# Patient Record
Sex: FEMALE | Race: ASIAN | Employment: UNEMPLOYED | URBAN - METROPOLITAN AREA
[De-identification: names, ages, dates, MRNs, and addresses within clinical notes are randomized per-mention and may not be internally consistent; named-entity substitution may affect disease eponyms.]

---

## 2022-05-24 ENCOUNTER — OFFICE VISIT (OUTPATIENT)
Dept: CARDIOLOGY CLINIC | Age: 87
End: 2022-05-24
Payer: MEDICARE

## 2022-05-24 VITALS — DIASTOLIC BLOOD PRESSURE: 80 MMHG | WEIGHT: 143 LBS | SYSTOLIC BLOOD PRESSURE: 126 MMHG | HEART RATE: 94 BPM

## 2022-05-24 DIAGNOSIS — I49.9 IRREGULAR HEART RATE: ICD-10-CM

## 2022-05-24 DIAGNOSIS — R07.2 PRECORDIAL PAIN: ICD-10-CM

## 2022-05-24 DIAGNOSIS — I10 PRIMARY HYPERTENSION: ICD-10-CM

## 2022-05-24 DIAGNOSIS — I48.0 PAROXYSMAL ATRIAL FIBRILLATION (HCC): ICD-10-CM

## 2022-05-24 DIAGNOSIS — R07.9 CHEST PAIN, UNSPECIFIED TYPE: Primary | ICD-10-CM

## 2022-05-24 PROCEDURE — 99204 OFFICE O/P NEW MOD 45 MIN: CPT | Performed by: INTERNAL MEDICINE

## 2022-05-24 PROCEDURE — 93000 ELECTROCARDIOGRAM COMPLETE: CPT | Performed by: INTERNAL MEDICINE

## 2022-05-24 RX ORDER — NITROGLYCERIN 80 MG/1
PATCH TRANSDERMAL
COMMUNITY
Start: 2022-04-07 | End: 2022-05-24

## 2022-05-24 RX ORDER — SOLIFENACIN SUCCINATE 5 MG/1
10 TABLET, FILM COATED ORAL DAILY
COMMUNITY

## 2022-05-24 RX ORDER — CLOPIDOGREL BISULFATE 75 MG/1
TABLET ORAL
COMMUNITY
Start: 2022-05-12 | End: 2022-05-24

## 2022-05-24 RX ORDER — EFINACONAZOLE 100 MG/ML
SOLUTION TOPICAL
COMMUNITY
Start: 2022-05-23

## 2022-05-24 RX ORDER — LIDOCAINE 36 MG/1
PATCH TOPICAL
COMMUNITY
Start: 2022-05-20

## 2022-05-24 RX ORDER — LOSARTAN POTASSIUM 25 MG/1
TABLET ORAL
COMMUNITY
Start: 2022-05-23

## 2022-05-24 RX ORDER — NITROGLYCERIN 80 MG/1
1 PATCH TRANSDERMAL DAILY
Qty: 30 PATCH | Refills: 5 | Status: SHIPPED | OUTPATIENT
Start: 2022-05-24 | End: 2022-09-26

## 2022-05-24 RX ORDER — CYCLOBENZAPRINE HCL 10 MG
TABLET ORAL
COMMUNITY
Start: 2022-04-08

## 2022-05-24 ASSESSMENT — ENCOUNTER SYMPTOMS
CHEST TIGHTNESS: 0
BACK PAIN: 0
SHORTNESS OF BREATH: 0
BLOOD IN STOOL: 0
EYE DISCHARGE: 0
ABDOMINAL DISTENTION: 0
FACIAL SWELLING: 0
COUGH: 0
WHEEZING: 0
ABDOMINAL PAIN: 0
VOMITING: 0
COLOR CHANGE: 0

## 2022-05-24 NOTE — PROGRESS NOTES
[address] [facility]     Outpatient Cardiology         Patient Name:  Karla Landis  Requesting Physician: No admitting provider for patient encounter. Primary Care Physician: No primary care provider on file. Reason for Consultation/Chief Complaint:   Chief Complaint   Patient presents with    Atrial Fibrillation         History of Present Illness:    HPI     Yulissa Dean a 80 y.o. female with PMH of afib, HTN. Here for afib and chest pain. Referred by PCP. Hypertension, controlled current medications  A. fib, diagnosed in [place], on Plavix. Not on anticoagulation. Rate controlled today. We will get echocardiogram  Chest discomfort, precordial, triggered by exertion, relieved by rest, last anywhere from a few minutes to an hour. Associated with some shortness of breath    PMH  Past Medical History:   Diagnosis Date    Atrial fibrillation (HCC)     Hypertension        PSH  Past Surgical History:   Procedure Laterality Date    HYSTERECTOMY          Social HIstory  Social History     Tobacco Use    Smoking status: Never Smoker    Smokeless tobacco: Never Used   Substance Use Topics    Alcohol use: Never    Drug use: Never       Family History  History reviewed. No pertinent family history. Allergies   No Known Allergies    Medications:     Home Medications:  Were reviewed and are listed in nursing record. and/or listed below    Prior to Admission medications    Medication Sig Start Date End Date Taking?  Authorizing Provider   cyclobenzaprine (FLEXERIL) 10 mg tablet  4/8/22  Yes Historical Provider, MD WALTON 10 % SOLN  5/23/22  Yes Historical Provider, MD   ZTLIDO 1.8 % [facility]  5/20/22  Yes Historical Provider, MD   losartan (COZAAR) 25 MG tablet  5/23/22  Yes Historical Provider, MD   solifenacin (VESICARE) 5 MG tablet Take 10 mg by mouth daily   Yes Historical Provider, MD   nitroGLYCERIN (NITRODUR) 0.4 MG/HR Place 1 patch onto the skin daily 5/24/22  Yes Odin YMERS Teressa Medellin MD   apixaban Zilphia Sprain) 2.5 MG TABS tablet Take 1 tablet by mouth 2 times daily 5/24/22  Yes Keven Curtis MD        Review of Systems   Constitutional: Negative for activity change, appetite change, diaphoresis, fatigue, fever and unexpected weight change. HENT: Negative for congestion, facial swelling, mouth sores and nosebleeds. Eyes: Negative for discharge and visual disturbance. Respiratory: Negative for cough, chest tightness, shortness of breath and wheezing. Cardiovascular: Negative for chest pain, palpitations and leg swelling. Gastrointestinal: Negative for abdominal distention, abdominal pain, blood in stool and vomiting. Endocrine: Negative for cold intolerance, heat intolerance and polyuria. Genitourinary: Negative for difficulty urinating, dysuria, frequency and hematuria. Musculoskeletal: Negative for back pain, joint swelling, myalgias and neck pain. Skin: Negative for color change, pallor and rash. Allergic/Immunologic: Negative for immunocompromised state. Neurological: Negative for dizziness, syncope, weakness, light-headedness, numbness and headaches. Hematological: Negative for adenopathy. Does not bruise/bleed easily. Psychiatric/Behavioral: Negative for behavioral problems, confusion, decreased concentration and suicidal ideas. The patient is not nervous/anxious. Vitals:    05/24/22 1401   BP: 126/80   Pulse: 94    Weight: 143 lb (64.9 kg)       Vitals:    05/24/22 1401   BP: 126/80   Pulse: 94   Weight: 143 lb (64.9 kg)       BP Readings from Last 3 Encounters:   05/24/22 126/80       Wt Readings from Last 3 Encounters:   05/24/22 143 lb (64.9 kg)       Physical Exam  Constitutional:       General: She is not in acute distress. Appearance: She is well-developed. She is not diaphoretic. HENT:      Head: Normocephalic and atraumatic. Eyes:      Pupils: Pupils are equal, round, and reactive to light. Neck:      Thyroid: No thyromegaly. Vascular: No JVD. Cardiovascular:      Rate and Rhythm: Normal rate and regular rhythm. Chest Wall: PMI is not displaced. Heart sounds: Normal heart sounds, S1 normal and S2 normal. No murmur heard. No friction rub. No gallop. Pulmonary:      Effort: Pulmonary effort is normal. No respiratory distress. Breath sounds: Normal breath sounds. No stridor. No wheezing or rales. Chest:      Chest wall: No tenderness. Abdominal:      General: Bowel sounds are normal. There is no distension. Palpations: Abdomen is soft. Tenderness: There is no abdominal tenderness. There is no guarding or rebound. Musculoskeletal:         General: No tenderness. Normal range of motion. Cervical back: Normal range of motion. Lymphadenopathy:      Cervical: No cervical adenopathy. Skin:     General: Skin is warm and dry. Findings: No erythema or rash. Neurological:      Mental Status: She is alert and oriented to person, place, and time. Coordination: Coordination normal.   Psychiatric:         Behavior: Behavior normal.         Thought Content: Thought content normal.         Judgment: Judgment normal.         Labs:       No results found for: WBC, HGB, HCT, MCV, PLT  No results found for: NA, K, CL, CO2, BUN, CREATININE, GLUCOSE, CALCIUM, PROT, LABALBU, BILITOT, ALKPHOS, AST, ALT, LABGLOM, GFRAA, AGRATIO, GLOB      No results found for: CHOL  No results found for: TRIG  No results found for: HDL  No results found for: LDLCHOLESTEROL, LDLCALC  No results found for: LABVLDL, VLDL  No results found for: CHOLHDLRATIO    No results found for: INR, PROTIME    The ASCVD Risk score (Fabrizio Ricardo et al., 2013) failed to calculate for the following reasons:     The 2013 ASCVD risk score is only valid for ages 36 to 78      Imaging:       Last ECG (if available, Personally interpreted):  Atrial fibrillation  Last Monitor/Holter (if available):    Last Stress (if available):    Last Cath (if available):    Last TTE/TIEN(if available):    Last CMR  (if available):    Last Coronary Artery Calcium Score: Ankle-brachial index:    Carotid ultrasound screening:    Abdominal aortic aneurysm screening:       Assessment / Plan:     Paroxysmal atrial fibrillation (HCC)  Discontinue Plavix. Transition to Eliquis 2.5 twice a day. Rate controlled. Plan for echocardiogram and a stress test given symptoms. Primary hypertension  Controlled on losartan    Precordial pain  Using Nitropatch more frequently. Plan for stress test.      Follow up in 1 months. I had the opportunity to review the clinical symptoms and presentation of Rosalina Call. Patient's allergies and medications were reviewed and updated. Patient's past medical, surgical, social and family history were reviewed and updated. Patient's testing including laboratory, ECGs, monitor, imaging (TTE,TIEN,CMR,cath) were reviewed. Tobacco use was discussed with the patient and educated on the negative effects. I have asked the patient to not utilize these agents. All questions and concerns were addressed to the patient/family. Alternatives to my treatment were discussed. The note was completed using EMR. Every effort wasmade to ensure accuracy; however, inadvertent computerized transcription errors may be present. Thank you for allowing me to participate in thecare or [address] YIMI Martinez MD, [facility], [facility] [address]

## 2022-05-24 NOTE — ASSESSMENT & PLAN NOTE
Discontinue Plavix. Transition to Eliquis 2.5 twice a day. Rate controlled. Plan for echocardiogram and a stress test given symptoms.

## 2022-06-24 ENCOUNTER — HOSPITAL ENCOUNTER (OUTPATIENT)
Dept: NON INVASIVE DIAGNOSTICS | Age: 87
Discharge: HOME | End: 2022-06-24
Payer: MEDICARE

## 2022-06-24 DIAGNOSIS — R07.9 CHEST PAIN, UNSPECIFIED TYPE: ICD-10-CM

## 2022-06-24 LAB
LV EF: 58 %
LV EF: 76 %
LVEF MODALITY: NORMAL
LVEF MODALITY: NORMAL

## 2022-06-24 PROCEDURE — A9502 TC99M TETROFOSMIN: HCPCS | Performed by: INTERNAL MEDICINE

## 2022-06-24 PROCEDURE — 93306 TTE W/DOPPLER COMPLETE: CPT

## 2022-06-24 PROCEDURE — 3430000000 HC RX DIAGNOSTIC RADIOPHARMACEUTICAL: Performed by: INTERNAL MEDICINE

## 2022-06-24 PROCEDURE — 78452 HT MUSCLE IMAGE SPECT MULT: CPT

## 2022-06-24 PROCEDURE — 93017 CV STRESS TEST TRACING ONLY: CPT

## 2022-06-24 RX ADMIN — TETROFOSMIN 10 MILLICURIE: 1.38 INJECTION, POWDER, LYOPHILIZED, FOR SOLUTION INTRAVENOUS at 12:49

## 2022-06-29 ENCOUNTER — TELEPHONE (OUTPATIENT)
Dept: CARDIOLOGY CLINIC | Age: 87
End: 2022-06-29

## 2022-06-29 NOTE — TELEPHONE ENCOUNTER
Pt son wants to know if appt can be a VV. Mom is older and it would be easier to go over results they already received.  [phone]

## 2022-06-30 NOTE — TELEPHONE ENCOUNTER
Spoke with patient's son, patient is getting an upset stomach from taking eliquis and she would like  to go back on Plavix. Or would try alternative.

## 2022-07-04 NOTE — TELEPHONE ENCOUNTER
Can try Xarelto  Plavix is not indicated in Afib  If she doesn't want anticoagulation ASA 81 is the next \"best\" option  Thx

## 2022-07-05 NOTE — TELEPHONE ENCOUNTER
Spoke with patient's son and discussed options. He is going to talk with his mother and call back with her decision.

## 2022-07-06 ENCOUNTER — TELEPHONE (OUTPATIENT)
Dept: CARDIOLOGY CLINIC | Age: 87
End: 2022-07-06

## 2022-07-06 RX ORDER — ASPIRIN 81 MG/1
81 TABLET ORAL DAILY
Qty: 30 TABLET | Refills: 5 | Status: SHIPPED | OUTPATIENT
Start: 2022-07-06

## 2022-07-06 RX ORDER — ASPIRIN 81 MG/1
81 TABLET ORAL DAILY
Qty: 30 TABLET | Refills: 5 | Status: SHIPPED | OUTPATIENT
Start: 2022-07-06 | End: 2022-07-06

## 2022-07-06 NOTE — TELEPHONE ENCOUNTER
Patient's son called. Pt is having some side effects from the Elqiuis. He wants note from Dr. Helder Feldman that he can give to nursing home stating she can stop this and just be on aspirin. He would like this faxed to him. His fax number is [phone]    He would like a call to let him know when this has been faxed so he can make sure he has received it.

## 2022-09-26 RX ORDER — NITROGLYCERIN 80 MG/1
PATCH TRANSDERMAL
Qty: 90 PATCH | Refills: 3 | Status: SHIPPED | OUTPATIENT
Start: 2022-09-26

## 2022-12-19 ASSESSMENT — ENCOUNTER SYMPTOMS
BACK PAIN: 0
COUGH: 0
SHORTNESS OF BREATH: 0
VOMITING: 0
FACIAL SWELLING: 0
ABDOMINAL DISTENTION: 0
CHEST TIGHTNESS: 0
WHEEZING: 0
COLOR CHANGE: 0
EYE DISCHARGE: 0
BLOOD IN STOOL: 0
ABDOMINAL PAIN: 0

## 2022-12-19 NOTE — PROGRESS NOTES
[address] [facility]     Outpatient Cardiology         Patient Name:  Andre Maza  Requesting Physician: No admitting provider for patient encounter. Primary Care Physician: Josefina Byrnes MD    Reason for Consultation/Chief Complaint:   Chief Complaint   Patient presents with    Hypertension                  History of Present Illness:    HPI     Mehnaz Odom a 80 y.o. female with PMH of afib, HTN. Okay for anxiety  130 and 1 does not matter which 1 which is good given the best  A. fib, diagnosed in [place], heart rate is controlled. Doing well with Eliquis. Discontinue aspirin. Hypertension. Uncontrolled today. Will adjust medications. Chest pain, resolved. PMH  Past Medical History:   Diagnosis Date    Atrial fibrillation ([address]     Hypertension        PSH  Past Surgical History:   Procedure Laterality Date    HYSTERECTOMY (CERVIX STATUS UNKNOWN)          Social HIstory  Social History     Tobacco Use    Smoking status: Never    Smokeless tobacco: Never   Substance Use Topics    Alcohol use: Never    Drug use: Never       Family History  No family history on file. Allergies   No Known Allergies    Medications:     Home Medications:  Were reviewed and are listed in nursing record. and/or listed below    Prior to Admission medications    Medication Sig Start Date End Date Taking?  Authorizing Provider   ALPRAZolam Surprise Creamer) 0.25 MG tablet  10/21/22  Yes Historical Provider, MD   apixaban (ELIQUIS) 2.5 MG TABS tablet  10/15/22  Yes Historical Provider, MD   methocarbamol (ROBAXIN) 500 MG tablet  12/16/22  Yes Historical Provider, MD   oxybutynin (DITROPAN-XL) 5 MG extended release tablet  11/19/22  Yes Historical Provider, MD   losartan (COZAAR) 25 MG tablet Take 1 tablet by mouth 2 times daily 12/20/22  Yes Donna Chaudhry MD   nitroGLYCERIN (NITRODUR) 0.4 MG/HR UNWRAP AND APPLY 1 PATCH ONTO THE SKIN ONCE DAILY 9/26/22  Yes Donna Chaudhry MD   cyclobenzaprine ([place]) 10 mg tablet  4/8/22  Yes Historical Provider, MD WALTON 10 % SOLN  5/23/22  Yes Historical Provider, MD   ZTLIDO 1.8 % [facility]  5/20/22   Historical Provider, MD   solifenacin (VESICARE) 5 MG tablet Take 10 mg by mouth daily  Patient not taking: Reported on 12/20/2022    Historical Provider, MD        Review of Systems   Constitutional:  Negative for activity change, appetite change, diaphoresis, fatigue, fever and unexpected weight change. HENT:  Negative for congestion, facial swelling, mouth sores and nosebleeds. Eyes:  Negative for discharge and visual disturbance. Respiratory:  Negative for cough, chest tightness, shortness of breath and wheezing. Cardiovascular:  Negative for chest pain, palpitations and leg swelling. Gastrointestinal:  Negative for abdominal distention, abdominal pain, blood in stool and vomiting. Endocrine: Negative for cold intolerance, heat intolerance and polyuria. Genitourinary:  Negative for difficulty urinating, dysuria, frequency and hematuria. Musculoskeletal:  Negative for back pain, joint swelling, myalgias and neck pain. Skin:  Negative for color change, pallor and rash. Allergic/Immunologic: Negative for immunocompromised state. Neurological:  Negative for dizziness, syncope, weakness, light-headedness, numbness and headaches. Hematological:  Negative for adenopathy. Does not bruise/bleed easily. Psychiatric/Behavioral:  Negative for behavioral problems, confusion, decreased concentration and suicidal ideas. The patient is not nervous/anxious.       Vitals:    12/20/22 1436   BP: (!) 160/90   Pulse: 95    Weight: 135 lb 3.2 oz (61.3 kg)       Vitals:    12/20/22 1422 12/20/22 1435 12/20/22 1436   BP: 130/70 (!) 160/80 (!) 160/90   Site: Left Upper Arm Right Upper Arm Left Upper Arm   Position: Supine Sitting Sitting   Cuff Size: Medium Adult Medium Adult Medium Adult   Pulse: 84 79 95   Weight: 135 lb 3.2 oz (61.3 kg)         BP Readings from Last 3 Encounters:   12/20/22 (!) 160/90   05/24/22 126/80       Wt Readings from Last 3 Encounters:   12/20/22 135 lb 3.2 oz (61.3 kg)   05/24/22 143 lb (64.9 kg)       Physical Exam  Constitutional:       General: She is not in acute distress. Appearance: She is well-developed. She is not diaphoretic. HENT:      Head: Normocephalic and atraumatic. Eyes:      Pupils: Pupils are equal, round, and reactive to light. Neck:      Thyroid: No thyromegaly. Vascular: No JVD. Cardiovascular:      Rate and Rhythm: Normal rate and regular rhythm. Chest Wall: PMI is not displaced. Heart sounds: Normal heart sounds, S1 normal and S2 normal. No murmur heard. No friction rub. No gallop. Pulmonary:      Effort: Pulmonary effort is normal. No respiratory distress. Breath sounds: Normal breath sounds. No stridor. No wheezing or rales. Chest:      Chest wall: No tenderness. Abdominal:      General: Bowel sounds are normal. There is no distension. Palpations: Abdomen is soft. Tenderness: There is no abdominal tenderness. There is no guarding or rebound. Musculoskeletal:         General: No tenderness. Normal range of motion. Cervical back: Normal range of motion. Lymphadenopathy:      Cervical: No cervical adenopathy. Skin:     General: Skin is warm and dry. Findings: No erythema or rash. Neurological:      Mental Status: She is alert and oriented to person, place, and time. Coordination: Coordination normal.   Psychiatric:         Behavior: Behavior normal.         Thought Content:  Thought content normal.         Judgment: Judgment normal.       Labs:       No results found for: WBC, HGB, HCT, MCV, PLT  No results found for: NA, K, CL, CO2, BUN, CREATININE, GLUCOSE, CALCIUM, PROT, LABALBU, BILITOT, ALKPHOS, AST, ALT, LABGLOM, GFRAA, AGRATIO, GLOB      No results found for: CHOL  No results found for: TRIG  No results found for: HDL  No results found for: LDLCHOLESTEROL, LDLCALC  No results found for: LABVLDL, VLDL  No results found for: CHOLHDLRATIO    No results found for: INR, PROTIME    The ASCVD Risk score (Gerry DK, et al., 2019) failed to calculate for the following reasons: The 2019 ASCVD risk score is only valid for ages 36 to 78      Imaging:       Last ECG (if available, Personally interpreted):  Atrial fibrillation  Last Monitor/Holter (if available):    Last Stress (if available): 6/24/22  Summary    Overall findings represent a low risk scan. There is normal isotope uptake at stress and rest. There is no evidence of    myocardial ischemia or scar. Normal LV size and systolic function. Normal myocardial perfusion study. Non-diagnostic EKG response due to failure to reach target heart rate. Last Cath (if available):    Last TTE/EDIS(if available): 6/24/22  Summary   Left ventricular cavity size is normal. There is mild concentric left   ventricular hypertrophy. Overall left ventricular systolic function appears   normal with an ejection fraction of 55-60%. No regional wall motion   abnormalities are noted. Normal diastolic function. Mild mitral regurgitation is present. The aortic valve leaflets are slightly thickened /calcified but the valve   opens adequately. Estimated pulmonary artery systolic pressure is at 23 mmHg assuming a right   atrial pressure of 3 mmHg. Normal right ventricular size and function. The left atrial size appears dilated. The right atrium appears dilated. Last CMR  (if available):    Last Coronary Artery Calcium Score: Ankle-brachial index:    Carotid ultrasound screening:    Abdominal aortic aneurysm screening:       Assessment / Plan:     Primary hypertension  Uncontrolled today. Increase to losartan 25 mg twice a day. Paroxysmal atrial fibrillation (HCC)  Rate controlled. Continue Eliquis. Discontinue aspirin. Precordial pain  No recurrence.   Normal stress test.      I had the opportunity to review the clinical symptoms and presentation of Harris Monsivais. Patient's allergies and medications were reviewed and updated. Patient's past medical, surgical, social and family history were reviewed and updated. Patient's testing including laboratory, ECGs, monitor, imaging (TTE,TIEN,CMR,cath) were reviewed. Tobacco use was discussed with the patient and educated on the negative effects. I have asked the patient to not utilize these agents. All questions and concerns were addressed to the patient/family. Alternatives to my treatment were discussed. The note was completed using EMR. Every effort wasmade to ensure accuracy; however, inadvertent computerized transcription errors may be present. Thank you for allowing me to participate in thecare or [address] YIMI Correia MD, [facility] [place], [facility] [address]

## 2022-12-20 ENCOUNTER — OFFICE VISIT (OUTPATIENT)
Dept: CARDIOLOGY CLINIC | Age: 87
End: 2022-12-20
Payer: MEDICARE

## 2022-12-20 VITALS — SYSTOLIC BLOOD PRESSURE: 160 MMHG | WEIGHT: 135.2 LBS | HEART RATE: 95 BPM | DIASTOLIC BLOOD PRESSURE: 90 MMHG

## 2022-12-20 DIAGNOSIS — I10 PRIMARY HYPERTENSION: ICD-10-CM

## 2022-12-20 DIAGNOSIS — R07.2 PRECORDIAL PAIN: ICD-10-CM

## 2022-12-20 DIAGNOSIS — I48.0 PAROXYSMAL A-FIB (HCC): Primary | ICD-10-CM

## 2022-12-20 DIAGNOSIS — I48.0 PAROXYSMAL ATRIAL FIBRILLATION (HCC): ICD-10-CM

## 2022-12-20 PROCEDURE — 99213 OFFICE O/P EST LOW 20 MIN: CPT | Performed by: INTERNAL MEDICINE

## 2022-12-20 PROCEDURE — 1123F ACP DISCUSS/DSCN MKR DOCD: CPT | Performed by: INTERNAL MEDICINE

## 2022-12-20 PROCEDURE — 93000 ELECTROCARDIOGRAM COMPLETE: CPT | Performed by: INTERNAL MEDICINE

## 2022-12-20 RX ORDER — METHOCARBAMOL 500 MG/1
TABLET, FILM COATED ORAL
COMMUNITY
Start: 2022-12-16

## 2022-12-20 RX ORDER — LOSARTAN POTASSIUM 25 MG/1
25 TABLET ORAL 2 TIMES DAILY
Qty: 60 TABLET | Refills: 5 | Status: SHIPPED | OUTPATIENT
Start: 2022-12-20 | End: 2022-12-20 | Stop reason: SDUPTHER

## 2022-12-20 RX ORDER — ALPRAZOLAM 0.25 MG/1
TABLET ORAL
COMMUNITY
Start: 2022-10-21

## 2022-12-20 RX ORDER — LOSARTAN POTASSIUM 25 MG/1
25 TABLET ORAL 2 TIMES DAILY
Qty: 60 TABLET | Refills: 5 | Status: SHIPPED | OUTPATIENT
Start: 2022-12-20

## 2022-12-20 RX ORDER — OXYBUTYNIN CHLORIDE 5 MG/1
TABLET, EXTENDED RELEASE ORAL
COMMUNITY
Start: 2022-11-19

## 2023-01-19 RX ORDER — ASPIRIN 81 MG/1
TABLET, COATED ORAL
Qty: 30 TABLET | Refills: 5 | OUTPATIENT
Start: 2023-01-19

## 2023-11-18 ENCOUNTER — APPOINTMENT (OUTPATIENT)
Dept: CT IMAGING | Age: 88
End: 2023-11-18
Payer: MEDICARE

## 2023-11-18 ENCOUNTER — HOSPITAL ENCOUNTER (INPATIENT)
Age: 88
LOS: 7 days | Discharge: NURSING, REHAB OR LONG-TERM CARE | End: 2023-11-25
Attending: STUDENT IN AN ORGANIZED HEALTH CARE EDUCATION/TRAINING PROGRAM | Admitting: INTERNAL MEDICINE
Payer: MEDICARE

## 2023-11-18 ENCOUNTER — APPOINTMENT (OUTPATIENT)
Dept: GENERAL RADIOLOGY | Age: 88
End: 2023-11-18
Payer: MEDICARE

## 2023-11-18 DIAGNOSIS — S72.002A HIP FRACTURE REQUIRING OPERATIVE REPAIR, LEFT, CLOSED, INITIAL ENCOUNTER (HCC): ICD-10-CM
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
## 2023-11-18 PROBLEM — S72.142A DISPLACED INTERTROCHANTERIC FRACTURE OF LEFT FEMUR, INITIAL ENCOUNTER FOR CLOSED FRACTURE (HCC): Status: ACTIVE | Noted: 2023-11-18

## 2023-11-18 LAB
ANION GAP SERPL CALCULATED.3IONS-SCNC: 7 MMOL/L (ref 3–16)
BASOPHILS # BLD: 0 K/UL (ref 0–0.2)
BASOPHILS NFR BLD: 0.6 %
BUN SERPL-MCNC: 19 MG/DL (ref 7–20)
CALCIUM SERPL-MCNC: 9.1 MG/DL (ref 8.3–10.6)
CHLORIDE SERPL-SCNC: 103 MMOL/L (ref 99–110)
CO2 SERPL-SCNC: 27 MMOL/L (ref 21–32)
CREAT SERPL-MCNC: 0.7 MG/DL (ref 0.6–1.2)
DEPRECATED RDW RBC AUTO: 14.7 % (ref 12.4–15.4)
EOSINOPHIL # BLD: 0 K/UL (ref 0–0.6)
EOSINOPHIL NFR BLD: 1 %
GFR SERPLBLD CREATININE-BSD FMLA CKD-EPI: >60 ML/MIN/{1.73_M2}
GLUCOSE SERPL-MCNC: 115 MG/DL (ref 70–99)
HCT VFR BLD AUTO: 37.4 % (ref 36–48)
HGB BLD-MCNC: 12.4 G/DL (ref 12–16)
LYMPHOCYTES # BLD: 0.9 K/UL (ref 1–5.1)
LYMPHOCYTES NFR BLD: 26.8 %
MCH RBC QN AUTO: 29.6 PG (ref 26–34)
MCHC RBC AUTO-ENTMCNC: 33.2 G/DL (ref 31–36)
MCV RBC AUTO: 89.2 FL (ref 80–100)
MONOCYTES # BLD: 0.3 K/UL (ref 0–1.3)
MONOCYTES NFR BLD: 8.4 %
NEUTROPHILS # BLD: 2.2 K/UL (ref 1.7–7.7)
NEUTROPHILS NFR BLD: 63.2 %
NT-PROBNP SERPL-MCNC: 1003 PG/ML (ref 0–449)
PLATELET # BLD AUTO: 93 K/UL (ref 135–450)
PMV BLD AUTO: 8.7 FL (ref 5–10.5)
POTASSIUM SERPL-SCNC: 3.9 MMOL/L (ref 3.5–5.1)
RBC # BLD AUTO: 4.19 M/UL (ref 4–5.2)
SODIUM SERPL-SCNC: 137 MMOL/L (ref 136–145)
TROPONIN, HIGH SENSITIVITY: 16 NG/L (ref 0–14)
WBC # BLD AUTO: 3.4 K/UL (ref 4–11)

## 2023-11-18 PROCEDURE — 2700000000 HC OXYGEN THERAPY PER DAY

## 2023-11-18 PROCEDURE — 72170 X-RAY EXAM OF PELVIS: CPT

## 2023-11-18 PROCEDURE — 94761 N-INVAS EAR/PLS OXIMETRY MLT: CPT

## 2023-11-18 PROCEDURE — 99285 EMERGENCY DEPT VISIT HI MDM: CPT

## 2023-11-18 PROCEDURE — 6370000000 HC RX 637 (ALT 250 FOR IP): Performed by: INTERNAL MEDICINE

## 2023-11-18 PROCEDURE — 1200000000 HC SEMI PRIVATE

## 2023-11-18 PROCEDURE — 70450 CT HEAD/BRAIN W/O DYE: CPT

## 2023-11-18 PROCEDURE — 85025 COMPLETE CBC W/AUTO DIFF WBC: CPT

## 2023-11-18 PROCEDURE — 73552 X-RAY EXAM OF FEMUR 2/>: CPT

## 2023-11-18 PROCEDURE — 36415 COLL VENOUS BLD VENIPUNCTURE: CPT

## 2023-11-18 PROCEDURE — 93005 ELECTROCARDIOGRAM TRACING: CPT | Performed by: STUDENT IN AN ORGANIZED HEALTH CARE EDUCATION/TRAINING PROGRAM

## 2023-11-18 PROCEDURE — 84484 ASSAY OF TROPONIN QUANT: CPT

## 2023-11-18 PROCEDURE — 2580000003 HC RX 258: Performed by: INTERNAL MEDICINE

## 2023-11-18 PROCEDURE — 72125 CT NECK SPINE W/O DYE: CPT

## 2023-11-18 PROCEDURE — 83880 ASSAY OF NATRIURETIC PEPTIDE: CPT

## 2023-11-18 PROCEDURE — 80048 BASIC METABOLIC PNL TOTAL CA: CPT

## 2023-11-18 RX ORDER — LOSARTAN POTASSIUM 25 MG/1
25 TABLET ORAL 2 TIMES DAILY
Status: DISCONTINUED | OUTPATIENT
Start: 2023-11-18 | End: 2023-11-25 | Stop reason: HOSPADM

## 2023-11-18 RX ORDER — SODIUM CHLORIDE 0.9 % (FLUSH) 0.9 %
5-40 SYRINGE (ML) INJECTION PRN
Status: DISCONTINUED | OUTPATIENT
Start: 2023-11-18 | End: 2023-11-25 | Stop reason: HOSPADM

## 2023-11-18 RX ORDER — MAGNESIUM SULFATE IN WATER 40 MG/ML
2000 INJECTION, SOLUTION INTRAVENOUS PRN
Status: DISCONTINUED | OUTPATIENT
Start: 2023-11-18 | End: 2023-11-25 | Stop reason: HOSPADM

## 2023-11-18 RX ORDER — METOPROLOL SUCCINATE 25 MG/1
25 TABLET, EXTENDED RELEASE ORAL DAILY
Status: DISCONTINUED | OUTPATIENT
Start: 2023-11-19 | End: 2023-11-25 | Stop reason: HOSPADM

## 2023-11-18 RX ORDER — POTASSIUM CHLORIDE 20 MEQ/1
20 TABLET, EXTENDED RELEASE ORAL DAILY
COMMUNITY
Start: 2023-11-08

## 2023-11-18 RX ORDER — SODIUM CHLORIDE, SODIUM LACTATE, POTASSIUM CHLORIDE, CALCIUM CHLORIDE 600; 310; 30; 20 MG/100ML; MG/100ML; MG/100ML; MG/100ML
INJECTION, SOLUTION INTRAVENOUS CONTINUOUS
Status: DISCONTINUED | OUTPATIENT
Start: 2023-11-18 | End: 2023-11-20 | Stop reason: SDUPTHER

## 2023-11-18 RX ORDER — OXYCODONE HYDROCHLORIDE 5 MG/1
10 TABLET ORAL EVERY 4 HOURS PRN
Status: DISCONTINUED | OUTPATIENT
Start: 2023-11-18 | End: 2023-11-20

## 2023-11-18 RX ORDER — POTASSIUM CHLORIDE 20 MEQ/1
40 TABLET, EXTENDED RELEASE ORAL PRN
Status: DISCONTINUED | OUTPATIENT
Start: 2023-11-18 | End: 2023-11-25 | Stop reason: HOSPADM

## 2023-11-18 RX ORDER — ONDANSETRON 2 MG/ML
4 INJECTION INTRAMUSCULAR; INTRAVENOUS EVERY 6 HOURS PRN
Status: DISCONTINUED | OUTPATIENT
Start: 2023-11-18 | End: 2023-11-24 | Stop reason: SDUPTHER

## 2023-11-18 RX ORDER — ACETAMINOPHEN 500 MG
1000 TABLET ORAL EVERY 8 HOURS SCHEDULED
Status: DISCONTINUED | OUTPATIENT
Start: 2023-11-18 | End: 2023-11-20

## 2023-11-18 RX ORDER — POTASSIUM CHLORIDE 7.45 MG/ML
10 INJECTION INTRAVENOUS PRN
Status: DISCONTINUED | OUTPATIENT
Start: 2023-11-18 | End: 2023-11-25 | Stop reason: HOSPADM

## 2023-11-18 RX ORDER — SENNA AND DOCUSATE SODIUM 50; 8.6 MG/1; MG/1
1 TABLET, FILM COATED ORAL 2 TIMES DAILY
Status: DISCONTINUED | OUTPATIENT
Start: 2023-11-18 | End: 2023-11-20

## 2023-11-18 RX ORDER — POLYETHYLENE GLYCOL 3350 17 G/17G
17 POWDER, FOR SOLUTION ORAL DAILY PRN
Status: DISCONTINUED | OUTPATIENT
Start: 2023-11-18 | End: 2023-11-20

## 2023-11-18 RX ORDER — SODIUM CHLORIDE 9 MG/ML
INJECTION, SOLUTION INTRAVENOUS PRN
Status: DISCONTINUED | OUTPATIENT
Start: 2023-11-18 | End: 2023-11-20 | Stop reason: SDUPTHER

## 2023-11-18 RX ORDER — OXYCODONE HYDROCHLORIDE 5 MG/1
5 TABLET ORAL EVERY 4 HOURS PRN
Status: DISCONTINUED | OUTPATIENT
Start: 2023-11-18 | End: 2023-11-20

## 2023-11-18 RX ORDER — METHOCARBAMOL 750 MG/1
750 TABLET, FILM COATED ORAL EVERY 8 HOURS
Status: COMPLETED | OUTPATIENT
Start: 2023-11-18 | End: 2023-11-19

## 2023-11-18 RX ORDER — SODIUM CHLORIDE 0.9 % (FLUSH) 0.9 %
5-40 SYRINGE (ML) INJECTION EVERY 12 HOURS SCHEDULED
Status: DISCONTINUED | OUTPATIENT
Start: 2023-11-18 | End: 2023-11-25 | Stop reason: HOSPADM

## 2023-11-18 RX ORDER — MIRTAZAPINE 15 MG/1
7.5 TABLET, FILM COATED ORAL NIGHTLY
Status: DISCONTINUED | OUTPATIENT
Start: 2023-11-18 | End: 2023-11-25 | Stop reason: HOSPADM

## 2023-11-18 RX ORDER — METOPROLOL SUCCINATE 25 MG/1
25 TABLET, EXTENDED RELEASE ORAL DAILY
COMMUNITY
Start: 2023-10-28

## 2023-11-18 RX ORDER — MIRTAZAPINE 7.5 MG/1
7.5 TABLET, FILM COATED ORAL NIGHTLY
COMMUNITY
Start: 2023-10-31

## 2023-11-18 RX ORDER — ONDANSETRON 4 MG/1
4 TABLET, ORALLY DISINTEGRATING ORAL EVERY 8 HOURS PRN
Status: DISCONTINUED | OUTPATIENT
Start: 2023-11-18 | End: 2023-11-20

## 2023-11-18 RX ADMIN — METHOCARBAMOL TABLETS 750 MG: 750 TABLET, COATED ORAL at 23:17

## 2023-11-18 RX ADMIN — ACETAMINOPHEN 1000 MG: 500 TABLET ORAL at 23:17

## 2023-11-18 RX ADMIN — LOSARTAN POTASSIUM 25 MG: 25 TABLET, FILM COATED ORAL at 23:17

## 2023-11-18 RX ADMIN — SODIUM CHLORIDE, PRESERVATIVE FREE 10 ML: 5 INJECTION INTRAVENOUS at 23:16

## 2023-11-18 RX ADMIN — DOCUSATE SODIUM 50 MG AND SENNOSIDES 8.6 MG 1 TABLET: 8.6; 5 TABLET, FILM COATED ORAL at 23:17

## 2023-11-18 RX ADMIN — SODIUM CHLORIDE, POTASSIUM CHLORIDE, SODIUM LACTATE AND CALCIUM CHLORIDE: 600; 310; 30; 20 INJECTION, SOLUTION INTRAVENOUS at 23:27

## 2023-11-18 RX ADMIN — MIRTAZAPINE 7.5 MG: 15 TABLET, FILM COATED ORAL at 23:50

## 2023-11-18 ASSESSMENT — PAIN DESCRIPTION - PAIN TYPE: TYPE: ACUTE PAIN

## 2023-11-18 ASSESSMENT — PAIN DESCRIPTION - DESCRIPTORS: DESCRIPTORS: ACHING

## 2023-11-18 ASSESSMENT — PAIN DESCRIPTION - LOCATION: LOCATION: LEG

## 2023-11-18 ASSESSMENT — PAIN DESCRIPTION - ORIENTATION: ORIENTATION: LEFT

## 2023-11-18 ASSESSMENT — PAIN DESCRIPTION - FREQUENCY: FREQUENCY: CONTINUOUS

## 2023-11-19 ENCOUNTER — APPOINTMENT (OUTPATIENT)
Dept: GENERAL RADIOLOGY | Age: 88
End: 2023-11-19
Payer: MEDICARE

## 2023-11-19 LAB
ABO + RH BLD: NORMAL
APTT BLD: 29.1 SEC (ref 22.7–35.9)
BACTERIA URNS QL MICRO: ABNORMAL /HPF
BILIRUB UR QL STRIP.AUTO: NEGATIVE
BLD GP AB SCN SERPL QL: NORMAL
BLOOD GROUP ANTIBODIES SERPL: NORMAL
CLARITY UR: CLEAR
COLOR UR: YELLOW
DAT IGG CAPTURE: NORMAL
EKG DIAGNOSIS: NORMAL
EKG Q-T INTERVAL: 358 MS
EKG QRS DURATION: 74 MS
EKG QTC CALCULATION (BAZETT): 457 MS
EKG R AXIS: 34 DEGREES
EKG T AXIS: 55 DEGREES
EKG VENTRICULAR RATE: 98 BPM
GLUCOSE UR STRIP.AUTO-MCNC: NEGATIVE MG/DL
HGB UR QL STRIP.AUTO: ABNORMAL
INR PPP: 1.14 (ref 0.84–1.16)
KETONES UR STRIP.AUTO-MCNC: NEGATIVE MG/DL
LEUKOCYTE ESTERASE UR QL STRIP.AUTO: ABNORMAL
NITRITE UR QL STRIP.AUTO: POSITIVE
PH UR STRIP.AUTO: 7 [PH] (ref 5–8)
PROT UR STRIP.AUTO-MCNC: NEGATIVE MG/DL
PROTHROMBIN TIME: 14.6 SEC (ref 11.5–14.8)
RBC #/AREA URNS HPF: ABNORMAL /HPF (ref 0–4)
SP GR UR STRIP.AUTO: 1.01 (ref 1–1.03)
UA COMPLETE W REFLEX CULTURE PNL UR: ABNORMAL
UA DIPSTICK W REFLEX MICRO PNL UR: YES
URN SPEC COLLECT METH UR: ABNORMAL
UROBILINOGEN UR STRIP-ACNC: 0.2 E.U./DL
WBC #/AREA URNS HPF: ABNORMAL /HPF (ref 0–5)

## 2023-11-19 PROCEDURE — 86900 BLOOD TYPING SEROLOGIC ABO: CPT

## 2023-11-19 PROCEDURE — 6370000000 HC RX 637 (ALT 250 FOR IP): Performed by: INTERNAL MEDICINE

## 2023-11-19 PROCEDURE — 94761 N-INVAS EAR/PLS OXIMETRY MLT: CPT

## 2023-11-19 PROCEDURE — 94150 VITAL CAPACITY TEST: CPT

## 2023-11-19 PROCEDURE — 86880 COOMBS TEST DIRECT: CPT

## 2023-11-19 PROCEDURE — 85730 THROMBOPLASTIN TIME PARTIAL: CPT

## 2023-11-19 PROCEDURE — 86922 COMPATIBILITY TEST ANTIGLOB: CPT

## 2023-11-19 PROCEDURE — 81001 URINALYSIS AUTO W/SCOPE: CPT

## 2023-11-19 PROCEDURE — 36415 COLL VENOUS BLD VENIPUNCTURE: CPT

## 2023-11-19 PROCEDURE — 86870 RBC ANTIBODY IDENTIFICATION: CPT

## 2023-11-19 PROCEDURE — 71045 X-RAY EXAM CHEST 1 VIEW: CPT

## 2023-11-19 PROCEDURE — 2580000003 HC RX 258: Performed by: INTERNAL MEDICINE

## 2023-11-19 PROCEDURE — 86901 BLOOD TYPING SEROLOGIC RH(D): CPT

## 2023-11-19 PROCEDURE — 1200000000 HC SEMI PRIVATE

## 2023-11-19 PROCEDURE — 85610 PROTHROMBIN TIME: CPT

## 2023-11-19 PROCEDURE — 86850 RBC ANTIBODY SCREEN: CPT

## 2023-11-19 PROCEDURE — 2700000000 HC OXYGEN THERAPY PER DAY

## 2023-11-19 RX ADMIN — METHOCARBAMOL TABLETS 750 MG: 750 TABLET, COATED ORAL at 06:13

## 2023-11-19 RX ADMIN — METOPROLOL SUCCINATE 25 MG: 25 TABLET, EXTENDED RELEASE ORAL at 10:44

## 2023-11-19 RX ADMIN — LOSARTAN POTASSIUM 25 MG: 25 TABLET, FILM COATED ORAL at 10:44

## 2023-11-19 RX ADMIN — SODIUM CHLORIDE, PRESERVATIVE FREE 10 ML: 5 INJECTION INTRAVENOUS at 10:45

## 2023-11-19 RX ADMIN — DOCUSATE SODIUM 50 MG AND SENNOSIDES 8.6 MG 1 TABLET: 8.6; 5 TABLET, FILM COATED ORAL at 10:45

## 2023-11-19 RX ADMIN — ACETAMINOPHEN 1000 MG: 500 TABLET ORAL at 06:13

## 2023-11-19 RX ADMIN — SODIUM CHLORIDE, PRESERVATIVE FREE 10 ML: 5 INJECTION INTRAVENOUS at 20:28

## 2023-11-19 RX ADMIN — METHOCARBAMOL TABLETS 750 MG: 750 TABLET, COATED ORAL at 12:51

## 2023-11-19 RX ADMIN — ACETAMINOPHEN 1000 MG: 500 TABLET ORAL at 12:51

## 2023-11-19 ASSESSMENT — PAIN SCALES - WONG BAKER: WONGBAKER_NUMERICALRESPONSE: 0

## 2023-11-19 NOTE — PROGRESS NOTES
Patient admitted to room 5505. Report received from RN via SBAR. VSS on 1 L of O2. Rights and responsibilites provided to patient, and welcome packet provided to patient. Son updated via phone call and admission documentation completed with him. 4 eyes skin assessment completed with 2nd RN.

## 2023-11-19 NOTE — PLAN OF CARE
[facility] Hospitalist brief note  Consult received. Case reviewed with ER physician  80-year-old female presenting with left intertrochanteric femur fracture after nonsyncopal ground-level fall. Full note to follow.     Mary Hurtado MD    Thanks  Gaston Khan MD

## 2023-11-19 NOTE — PROGRESS NOTES
V2.0    [facility] Progress Note      Name:  Barney Peraza /Age/Sex: 3/24/1932  (80 y.o. female)   MRN & CSN:  [id number] & [id number] Encounter Date/Time: 2023 11:05 AM EST   Location:  [facility]5/5505-01 PCP: Blake Humphrey MD     Attending:Alex Polanco MD       Hospital Day: 2    Assessment and Recommendations   Barney Peraza is a 80 y.o. female with pmh of atrial fibrillation on anticoagulation who presented with Displaced intertrochanteric fracture of left femur, initial encounter for closed fracture [facility])    Patient is a 80-year-old nursing home resident who presented to the emergency room with complaint of left hip pain after unwitnessed fall. X-rays demonstrated a displaced subcapital left femoral fracture. Plan:   Left hip fracture after fall-orthopedics consulted. Patient will require surgical intervention on . Surgery delayed secondary to Eliquis  Hypertension-continue home losartan  [id number] Toprol      Diet ADULT DIET; Regular  Diet NPO Exceptions are: Sips of Water with Meds   DVT Prophylaxis [] Lovenox, []  Heparin, [] SCDs, [] Ambulation,  [] Eliquis, [] Xarelto  [] Coumadin   Code Status Full Code   Disposition From: Skilled nursing facility  Expected Disposition: Skilled nursing facility  Estimated Date of Discharge: 2 to 3 days  Patient requires continued admission due to need for surgical intervention   Surrogate Decision Maker/ POA  Per EMR     Personally reviewed Lab Studies and Imaging   2023  CBC and BMP demonstrate no significant abnormality  [id number]    Discussed management of the case with orthopedics who recommended surgery on  due to Eliquis    EKG interpreted personally and results demonstrated A-fib with no ischemic changes.     Imaging that was interpreted personally includes x-rays of the left and results display subcapital fracture        Subjective:     Chief Complaint: Left hip pain    Barney Peraza is a 80 y.o. female who presents

## 2023-11-19 NOTE — PROGRESS NOTES
4 Eyes Skin Assessment     NAME:  Ryland Hashimoto  YOB: 1932  MEDICAL RECORD NUMBER:  [id number]    The patient is being assessed for  Admission    I agree that at least one RN has performed a thorough Head to Toe Skin Assessment on the patient. ALL assessment sites listed below have been assessed. Areas assessed by both nurses:    Head, Face, Ears, Shoulders, Back, Chest, Arms, Elbows, Hands, Sacrum. Buttock, Coccyx, Ischium, Legs. Feet and Heels, and Under Medical Devices         Does the Patient have a Wound?  No noted wound(s)     Bruising to left hip, discoloration to coccyx  Malachi Prevention initiated by RN: Yes  Wound Care Orders initiated by RN: No    Pressure Injury (Stage 3,4, Unstageable, DTI, NWPT, and Complex wounds) if present, place Wound referral order by RN under : No    New Ostomies, if present place, Ostomy referral order under : No     Nurse 1 eSignature: Electronically signed by Helena Vidal RN on 11/18/23 at 11:33 PM EST    **SHARE this note so that the co-signing nurse can place an eSignature**    Nurse 2 eSignature: Electronically signed by Yanelis Sutherland RN on 11/19/23 at 12:47 AM EST

## 2023-11-19 NOTE — CONSULTS
Attending : Silver Crespo MD   Consult Note        Reason for Consult:  fall, left hip pain   Requesting Physician: Elvis Landers MD  Date of Service: 11/18/2023 10:35 PM    CHIEF COMPLAINT:  As Above    History Obtained From:  electronic medical record, nurse    HISTORY OF PRESENT ILLNESS:                The patient is a 80 y.o. female who presents with above chief complaint. Past medical history including hypertension paroxysmal atrial fibrillation currently on Eliquis medical history obtained from medical record  Reportedly the patient was at her skilled nursing facility where she currently resides and sustained a fall landing onto her left hip. She was noted to be unable to ambulate after this unwitnessed fall. Limited history provided secondary to language barrier patient speaks Mandarin also has difficulty with hearing. Her son was at the bedside earlier in the emergency department and will plan to return tomorrow morning reportedly per nurse. The patient endorses pain in the left hip but denies any pain throughout the remainder of her extremities. Past Medical History:        Diagnosis Date    Atrial fibrillation ([address])     Hypertension      Past Surgical History:        Procedure Laterality Date    HYSTERECTOMY (CERVIX STATUS UNKNOWN)           Medications Prior to Admission:   Prior to Admission medications    Medication Sig Start Date End Date Taking? Authorizing Provider   metoprolol succinate (TOPROL XL) 25 MG extended release tablet Take 1 tablet by mouth daily 10/28/23  Yes ProviderMae MD   mirtazapine (REMERON) 7.5 MG tablet Take 1 tablet by mouth nightly 10/31/23  Yes ProviderMae MD   potassium chloride (KLOR-CON M) 20 MEQ extended release tablet Take 1 tablet by mouth daily 11/8/23  Yes Mae Buenrostro MD   ALPRAZolam (XANAX) 0.25 MG tablet Take 2 tablets by mouth nightly as needed.  10/21/22   Mae Buenrostro MD   apixaban (ELIQUIS) 2.5 MG TABS tablet Take 1 tablet by mouth 2 times daily 10/15/22   Mae Buenrostro MD   methocarbamol (ROBAXIN) 500 MG tablet Take 0.5 tablets by mouth in the morning and at bedtime 12/16/22   Mae Buenrostro MD   oxybutynin (DITROPAN-XL) 5 MG extended release tablet  11/19/22   Mae Buenrostro MD   losartan (COZAAR) 25 MG tablet Take 1 tablet by mouth 2 times daily 12/20/22   Kyung Blakely MD   nitroGLYCERIN (NITRODUR) 0.4 MG/HR UNWRAP AND APPLY 1 PATCH ONTO THE SKIN ONCE DAILY 9/26/22   Kyung Blakely MD   cyclobenzaprine (FLEXERIL) 10 mg tablet  4/8/22   Provider, Historical, MD   JUBLIA 10 % SOLN  5/23/22   Provider, Historical, MD   ZTLIDO 1.8 % [facility]  5/20/22   Mae Buenrostro MD   solifenacin (VESICARE) 5 MG tablet Take 10 mg by mouth daily  Patient not taking: Reported on 12/20/2022    Mae Buenrostro MD       Allergies:  Pcn [penicillins]    Social History:    Tobacco:  reports that she has never smoked. She has never used smokeless tobacco.   Alcohol:  reports no history of alcohol use. Illicit Drug: No  Family History:   No family history on file. REVIEW OF SYSTEMS:   All additional Review of Systems were reviewed and noted to be negative or noncontributory today.    CONSTITUTIONAL:  negative  MUSCULOSKELETAL:  positive for  pain left hip    PHYSICAL EXAM:    awake, alert, cooperative, no apparent distress, and appears stated age  MUSCULOSKELETAL:  there is no redness, warmth, or swelling of the joints  full range of motion noted  motor strength is 5 out of 5 all extremities bilaterally  tone is normal  with exception of  Left lower extremity:  Shortened left lower extremity with external rotation at the hip  Mild swelling of the hip and thigh with compartment soft and compressible  No open wounds or additional skin changes throughout the left lower extremity  No swelling throughout remainder of left lower extremity  Calves are soft and nontender  No tenderness globally throughout the

## 2023-11-19 NOTE — PLAN OF CARE
Problem: Discharge Planning  Goal: Discharge to home or other facility with appropriate resources  11/19/2023 0734 by Lenin Rodgers RN  Outcome: Progressing  Flowsheets (Taken 11/19/2023 0359 by Keenan Mariscal RN)  Discharge to home or other facility with appropriate resources:   Identify barriers to discharge with patient and caregiver   Arrange for needed discharge resources and transportation as appropriate   Identify discharge learning needs (meds, wound care, etc)     Problem: Pain  Goal: Verbalizes/displays adequate comfort level or baseline comfort level  11/19/2023 0734 by Lenin Rodgers RN  Outcome: Progressing     Problem: Skin/Tissue Integrity  Goal: Absence of new skin breakdown  Description: 1. Monitor for areas of redness and/or skin breakdown  2. Assess vascular access sites hourly  3. Every 4-6 hours minimum:  Change oxygen saturation probe site  4. Every 4-6 hours:  If on nasal continuous positive airway pressure, respiratory therapy assess nares and determine need for appliance change or resting period.   Outcome: Progressing

## 2023-11-19 NOTE — ED NOTES
ED TO INPATIENT SBAR HANDOFF    Patient Name: Sorin Dietrich   :  3/24/1932  80 y.o. MRN:  [id number]  Preferred Name  n/a  ED Room #:  B15/B15-15  Family/Caregiver Present no   Restraints no   Sitter no   Sepsis Risk Score Sepsis Risk Score: 3.96    Situation  Code Status: Full Code No additional code details. Allergies: Pcn [penicillins]  Weight: Patient Vitals for the past 96 hrs (Last 3 readings):   Weight   23 50.8 kg (112 lb)     Arrived from: nursing home  Chief Complaint:   Chief Complaint   Patient presents with    Fall     Unwitnessed fall from [facility]. C/o left hip pain. Rotation and shortening noted on left leg      Hospital Problem/Diagnosis:  Principal Problem:    Displaced intertrochanteric fracture of left femur, initial encounter for closed fracture [facility])  Active Problems:    Paroxysmal atrial fibrillation ([address])    Primary hypertension  Resolved Problems:    * No resolved hospital problems. *    Imaging:   XR PELVIS (1-2 VIEWS)   Final Result   Impression:   Displaced subcapital left femoral fracture. Electronically signed by Abeba Sorenson MD      XR FEMUR LEFT (MIN 2 VIEWS)   Final Result   Impression:   Displaced subcapital left femoral fracture. Electronically signed by Abeba Sorenson MD      CT HEAD WO CONTRAST   Final Result      CT brain:   No acute intracranial hemorrhage or mass effect. Mild right scalp soft tissue swelling. No calvarial fracture. CT cervical spine:   No acute vertebral fracture or traumatic subluxation. Electronically signed by Abeba Sorenson MD      CT CERVICAL SPINE [facility]   Final Result      CT brain:   No acute intracranial hemorrhage or mass effect. Mild right scalp soft tissue swelling. No calvarial fracture. CT cervical spine:   No acute vertebral fracture or traumatic subluxation.           Electronically signed by Abeba Sorenson MD        Abnormal labs:   Abnormal Labs Reviewed   CBC WITH AUTO DIFFERENTIAL -

## 2023-11-19 NOTE — PROGRESS NOTES
Pt alert and oriented X4, needs interpretor and some difficulties in hearing. VS taken and recorded. Spo2 maintained at O2 inhalation at 2 L. . Is on  MN NPO. IV fluid continue as per MAR. Pt is in pure wick and voiding adequately. No BM during this shift. All fall precautions in place, call light within reach, free form fall injury. Plan of care ongoing.

## 2023-11-19 NOTE — PLAN OF CARE
Problem: Discharge Planning  Goal: Discharge to home or other facility with appropriate resources  Outcome: Progressing  Flowsheets  Taken 11/19/2023 0000  Discharge to home or other facility with appropriate resources:   Identify barriers to discharge with patient and caregiver   Arrange for needed discharge resources and transportation as appropriate   Identify discharge learning needs (meds, wound care, etc)  Taken 11/18/2023 2300  Discharge to home or other facility with appropriate resources:   Identify barriers to discharge with patient and caregiver   Arrange for needed discharge resources and transportation as appropriate   Identify discharge learning needs (meds, wound care, etc)     Problem: Pain  Goal: Verbalizes/displays adequate comfort level or baseline comfort level  Outcome: Progressing     Problem: Safety - Adult  Goal: Free from fall injury  Outcome: Progressing  Note: All fall precautions in place, call light within reach. Free from fall injury. Problem: ABCDS Injury Assessment  Goal: Absence of physical injury  Outcome: Progressing  Note: Pt will be free from physical injury.

## 2023-11-19 NOTE — H&P
V2.0  History and Physical      Name:  Celine Greenfield /Age/Sex: 3/24/1932  (80 y.o. female)   MRN & CSN:  [id number] & [id number] Encounter Date/Time: 2023 9:44 PM EST   Location:  [place] [address] PCP: Brain Schuster MD       Hospital Day: 1    Assessment and Plan:   Celine Greenfield is a 80 y.o. female non-smoker with a pmh of hypertension and paroxysmal A-fib on Eliquis who presents with Displaced intertrochanteric fracture of left femur, initial encounter for closed fracture ([address]) after a nonsyncopal ground-level fall. Hospital Problems             Last Modified POA    * (Principal) Displaced intertrochanteric fracture of left femur, initial encounter for closed fracture ([address]) 2023 Yes    Primary hypertension 2023 Yes    Paroxysmal atrial fibrillation ([address]) 2023 Yes       Plan:  Pain management  Bedrest  Ortho consult for further evaluation and definitive treatment  Hold Eliquis  Check EKG, UA, BNP, troponin, CXR for preop evaluation  Resume home regimen for chronic stable conditions with the above-mentioned modification    Disposition:   Current Living situation: Home  Expected Disposition: Home versus SNF  Estimated D/C: 3 to 4 days    Diet Diet NPO Exceptions are: Sips of Water with Meds  ADULT DIET; Regular   DVT Prophylaxis [] Lovenox, []  Heparin, [x] SCDs, [] Ambulation,  [] Eliquis, [] Xarelto, [] Coumadin   Code Status Full Code   Surrogate Decision Maker/ POA Son     Personally reviewed Lab Studies and Imaging     Discussed management of the case with ED provider who recommended admission. EKG interpreted personally and results none done yet. Imaging that was interpreted personally includes pelvic and left hip x-rays and results displaced left intertrochanteric femoral fracture. Drugs that require monitoring for toxicity include none and the method of monitoring was n/a.         History from:     EMR    History of Present Illness:     Chief Complaint: Left hip pain neural foraminal narrowing at C4-5 and C5-6. No paraspinal soft tissue abnormality. 1.2 cm hypodense left thyroid nodule. Left apical pleural-parenchymal scarring. CT brain: No acute intracranial hemorrhage or mass effect. Mild right scalp soft tissue swelling. No calvarial fracture. CT cervical spine: No acute vertebral fracture or traumatic subluxation. Electronically signed by Marques Rubi MD    CT CERVICAL SPINE WO CONTRAST    Result Date: 11/18/2023  CT HEAD WO CONTRAST, CT CERVICAL SPINE WO CONTRAST CLINICAL HISTORY: 80 years Female; \"fall\". COMPARISON: None. TECHNIQUE: CT brain and cervical spine without contrast per standard protocol. Axial images and multiplanar reformatted images are provided for review. Up-to-date CT equipment and radiation dose reduction techniques were employed. FINDINGS: CT brain: No acute intracranial hemorrhage, mass effect, midline shift, or hydrocephalus. Gray-white matter differentiation is within normal limits. Mild patchy periventricular white matter hypodensities, likely chronic microvascular ischemic changes. Ventricles and sulci are prominent compatible with cerebral volume loss. Basal cisterns are clear. Visualized paranasal sinuses are clear. Visualized mastoid air cells are clear. Mild right scalp soft tissue swelling. Calvarium is intact. CT cervical spine: Normal cervical lordosis. Vertebral body height are preserved. Mild degenerative retrolisthesis of C3 on C4, C4 and C5, C5 on C6. 6  No acute fracture or traumatic subluxation. No prevertebral soft tissue swelling. Mild multilevel degenerative changes of the cervical spine including disc space height loss, endplate osteophytosis, and facet and uncovertebral hypertrophy. No significant stenosis of the bony spinal canal. Moderate bilateral neural foraminal narrowing at C4-5 and C5-6. No paraspinal soft tissue abnormality. 1.2 cm hypodense left thyroid nodule. Left apical pleural-parenchymal scarring.      CT brain: No

## 2023-11-20 ENCOUNTER — APPOINTMENT (OUTPATIENT)
Dept: GENERAL RADIOLOGY | Age: 88
End: 2023-11-20
Payer: MEDICARE

## 2023-11-20 ENCOUNTER — ANESTHESIA EVENT (OUTPATIENT)
Dept: OPERATING ROOM | Age: 88
End: 2023-11-20
Payer: MEDICARE

## 2023-11-20 ENCOUNTER — ANESTHESIA (OUTPATIENT)
Dept: OPERATING ROOM | Age: 88
End: 2023-11-20
Payer: MEDICARE

## 2023-11-20 PROCEDURE — 3600000004 HC SURGERY LEVEL 4 BASE: Performed by: ORTHOPAEDIC SURGERY

## 2023-11-20 PROCEDURE — 73501 X-RAY EXAM HIP UNI 1 VIEW: CPT

## 2023-11-20 PROCEDURE — 6370000000 HC RX 637 (ALT 250 FOR IP): Performed by: ORTHOPAEDIC SURGERY

## 2023-11-20 PROCEDURE — 2580000003 HC RX 258: Performed by: NURSE ANESTHETIST, CERTIFIED REGISTERED

## 2023-11-20 PROCEDURE — 73502 X-RAY EXAM HIP UNI 2-3 VIEWS: CPT

## 2023-11-20 PROCEDURE — 2500000003 HC RX 250 WO HCPCS: Performed by: NURSE ANESTHETIST, CERTIFIED REGISTERED

## 2023-11-20 PROCEDURE — 2580000003 HC RX 258: Performed by: ORTHOPAEDIC SURGERY

## 2023-11-20 PROCEDURE — 3700000001 HC ADD 15 MINUTES (ANESTHESIA): Performed by: ORTHOPAEDIC SURGERY

## 2023-11-20 PROCEDURE — 2709999900 HC NON-CHARGEABLE SUPPLY: Performed by: ORTHOPAEDIC SURGERY

## 2023-11-20 PROCEDURE — 6370000000 HC RX 637 (ALT 250 FOR IP): Performed by: INTERNAL MEDICINE

## 2023-11-20 PROCEDURE — C1713 ANCHOR/SCREW BN/BN,TIS/BN: HCPCS | Performed by: ORTHOPAEDIC SURGERY

## 2023-11-20 PROCEDURE — 3700000000 HC ANESTHESIA ATTENDED CARE: Performed by: ORTHOPAEDIC SURGERY

## 2023-11-20 PROCEDURE — 1200000000 HC SEMI PRIVATE

## 2023-11-20 PROCEDURE — 0SRS019 REPLACEMENT OF LEFT HIP JOINT, FEMORAL SURFACE WITH METAL SYNTHETIC SUBSTITUTE, CEMENTED, OPEN APPROACH: ICD-10-PCS | Performed by: ORTHOPAEDIC SURGERY

## 2023-11-20 PROCEDURE — 2580000003 HC RX 258: Performed by: INTERNAL MEDICINE

## 2023-11-20 PROCEDURE — 88305 TISSUE EXAM BY PATHOLOGIST: CPT

## 2023-11-20 PROCEDURE — 7100000000 HC PACU RECOVERY - FIRST 15 MIN: Performed by: ORTHOPAEDIC SURGERY

## 2023-11-20 PROCEDURE — 6360000002 HC RX W HCPCS: Performed by: ORTHOPAEDIC SURGERY

## 2023-11-20 PROCEDURE — 2720000010 HC SURG SUPPLY STERILE: Performed by: ORTHOPAEDIC SURGERY

## 2023-11-20 PROCEDURE — 2700000000 HC OXYGEN THERAPY PER DAY

## 2023-11-20 PROCEDURE — 6360000002 HC RX W HCPCS: Performed by: NURSE ANESTHETIST, CERTIFIED REGISTERED

## 2023-11-20 PROCEDURE — C1776 JOINT DEVICE (IMPLANTABLE): HCPCS | Performed by: ORTHOPAEDIC SURGERY

## 2023-11-20 PROCEDURE — 94761 N-INVAS EAR/PLS OXIMETRY MLT: CPT

## 2023-11-20 PROCEDURE — A4217 STERILE WATER/SALINE, 500 ML: HCPCS | Performed by: ORTHOPAEDIC SURGERY

## 2023-11-20 PROCEDURE — 88311 DECALCIFY TISSUE: CPT

## 2023-11-20 PROCEDURE — 7100000001 HC PACU RECOVERY - ADDTL 15 MIN: Performed by: ORTHOPAEDIC SURGERY

## 2023-11-20 PROCEDURE — 3600000014 HC SURGERY LEVEL 4 ADDTL 15MIN: Performed by: ORTHOPAEDIC SURGERY

## 2023-11-20 DEVICE — AVENIR® STANDARD STEM CEMENTED 7
Type: IMPLANTABLE DEVICE | Site: HIP | Status: FUNCTIONAL
Brand: AVENIR®

## 2023-11-20 DEVICE — CEMENT BNE 40 GM RADIOPAQUE BA SIMPLEX P: Type: IMPLANTABLE DEVICE | Site: HIP | Status: FUNCTIONAL

## 2023-11-20 DEVICE — BIPOLAR SHELL 45MM OD: Type: IMPLANTABLE DEVICE | Site: HIP | Status: FUNCTIONAL

## 2023-11-20 DEVICE — HIP H4 HEMI UNIV BIPLR IMPL CAPPED H4: Type: IMPLANTABLE DEVICE | Site: HIP | Status: FUNCTIONAL

## 2023-11-20 DEVICE — BIPOLAR LINER 44/45/46MM OD X 28MM ID: Type: IMPLANTABLE DEVICE | Site: HIP | Status: FUNCTIONAL

## 2023-11-20 DEVICE — IMPLANT FEMORAL HEAD ZB 12/14 COCR HD: Type: IMPLANTABLE DEVICE | Site: HIP | Status: FUNCTIONAL

## 2023-11-20 RX ORDER — ENOXAPARIN SODIUM 100 MG/ML
40 INJECTION SUBCUTANEOUS DAILY
Status: DISCONTINUED | OUTPATIENT
Start: 2023-11-21 | End: 2023-11-22

## 2023-11-20 RX ORDER — SUCCINYLCHOLINE/SOD CL,ISO/PF 200MG/10ML
SYRINGE (ML) INTRAVENOUS PRN
Status: DISCONTINUED | OUTPATIENT
Start: 2023-11-20 | End: 2023-11-20 | Stop reason: SDUPTHER

## 2023-11-20 RX ORDER — POLYETHYLENE GLYCOL 3350 17 G/17G
17 POWDER, FOR SOLUTION ORAL DAILY PRN
Status: DISCONTINUED | OUTPATIENT
Start: 2023-11-20 | End: 2023-11-25 | Stop reason: HOSPADM

## 2023-11-20 RX ORDER — FENTANYL CITRATE 50 UG/ML
25 INJECTION, SOLUTION INTRAMUSCULAR; INTRAVENOUS EVERY 5 MIN PRN
Status: DISCONTINUED | OUTPATIENT
Start: 2023-11-20 | End: 2023-11-20 | Stop reason: HOSPADM

## 2023-11-20 RX ORDER — SODIUM CHLORIDE 0.9 % (FLUSH) 0.9 %
5-40 SYRINGE (ML) INJECTION EVERY 12 HOURS SCHEDULED
Status: DISCONTINUED | OUTPATIENT
Start: 2023-11-20 | End: 2023-11-20 | Stop reason: HOSPADM

## 2023-11-20 RX ORDER — CEFAZOLIN SODIUM 1 G/3ML
INJECTION, POWDER, FOR SOLUTION INTRAMUSCULAR; INTRAVENOUS PRN
Status: DISCONTINUED | OUTPATIENT
Start: 2023-11-20 | End: 2023-11-20 | Stop reason: SDUPTHER

## 2023-11-20 RX ORDER — SODIUM CHLORIDE 9 MG/ML
INJECTION, SOLUTION INTRAVENOUS CONTINUOUS PRN
Status: DISCONTINUED | OUTPATIENT
Start: 2023-11-20 | End: 2023-11-20 | Stop reason: SDUPTHER

## 2023-11-20 RX ORDER — BUPIVACAINE HYDROCHLORIDE 5 MG/ML
INJECTION, SOLUTION EPIDURAL; INTRACAUDAL PRN
Status: DISCONTINUED | OUTPATIENT
Start: 2023-11-20 | End: 2023-11-20 | Stop reason: HOSPADM

## 2023-11-20 RX ORDER — ONDANSETRON 4 MG/1
4 TABLET, ORALLY DISINTEGRATING ORAL EVERY 8 HOURS PRN
Status: DISCONTINUED | OUTPATIENT
Start: 2023-11-20 | End: 2023-11-25 | Stop reason: HOSPADM

## 2023-11-20 RX ORDER — PROPOFOL 10 MG/ML
INJECTION, EMULSION INTRAVENOUS PRN
Status: DISCONTINUED | OUTPATIENT
Start: 2023-11-20 | End: 2023-11-20 | Stop reason: SDUPTHER

## 2023-11-20 RX ORDER — OXYCODONE HYDROCHLORIDE 5 MG/1
2.5 TABLET ORAL
Status: DISCONTINUED | OUTPATIENT
Start: 2023-11-20 | End: 2023-11-25 | Stop reason: HOSPADM

## 2023-11-20 RX ORDER — MAGNESIUM HYDROXIDE 1200 MG/15ML
LIQUID ORAL CONTINUOUS PRN
Status: DISCONTINUED | OUTPATIENT
Start: 2023-11-20 | End: 2023-11-20 | Stop reason: HOSPADM

## 2023-11-20 RX ORDER — PROCHLORPERAZINE EDISYLATE 5 MG/ML
5 INJECTION INTRAMUSCULAR; INTRAVENOUS
Status: DISCONTINUED | OUTPATIENT
Start: 2023-11-20 | End: 2023-11-20 | Stop reason: HOSPADM

## 2023-11-20 RX ORDER — NALBUPHINE HYDROCHLORIDE 10 MG/ML
5 INJECTION, SOLUTION INTRAMUSCULAR; INTRAVENOUS; SUBCUTANEOUS EVERY 5 MIN PRN
Status: DISCONTINUED | OUTPATIENT
Start: 2023-11-20 | End: 2023-11-25 | Stop reason: HOSPADM

## 2023-11-20 RX ORDER — ONDANSETRON 2 MG/ML
4 INJECTION INTRAMUSCULAR; INTRAVENOUS
Status: DISCONTINUED | OUTPATIENT
Start: 2023-11-20 | End: 2023-11-20 | Stop reason: HOSPADM

## 2023-11-20 RX ORDER — FENTANYL CITRATE 50 UG/ML
INJECTION, SOLUTION INTRAMUSCULAR; INTRAVENOUS PRN
Status: DISCONTINUED | OUTPATIENT
Start: 2023-11-20 | End: 2023-11-20 | Stop reason: SDUPTHER

## 2023-11-20 RX ORDER — HYDROMORPHONE HYDROCHLORIDE 1 MG/ML
0.5 INJECTION, SOLUTION INTRAMUSCULAR; INTRAVENOUS; SUBCUTANEOUS
Status: DISPENSED | OUTPATIENT
Start: 2023-11-20 | End: 2023-11-22

## 2023-11-20 RX ORDER — SODIUM CHLORIDE, SODIUM LACTATE, POTASSIUM CHLORIDE, CALCIUM CHLORIDE 600; 310; 30; 20 MG/100ML; MG/100ML; MG/100ML; MG/100ML
INJECTION, SOLUTION INTRAVENOUS CONTINUOUS
Status: DISCONTINUED | OUTPATIENT
Start: 2023-11-20 | End: 2023-11-21

## 2023-11-20 RX ORDER — OXYCODONE HYDROCHLORIDE 5 MG/1
5 TABLET ORAL
Status: DISCONTINUED | OUTPATIENT
Start: 2023-11-20 | End: 2023-11-25 | Stop reason: HOSPADM

## 2023-11-20 RX ORDER — DIPHENHYDRAMINE HYDROCHLORIDE 50 MG/ML
12.5 INJECTION INTRAMUSCULAR; INTRAVENOUS
Status: DISCONTINUED | OUTPATIENT
Start: 2023-11-20 | End: 2023-11-20 | Stop reason: HOSPADM

## 2023-11-20 RX ORDER — LABETALOL HYDROCHLORIDE 5 MG/ML
10 INJECTION, SOLUTION INTRAVENOUS
Status: DISCONTINUED | OUTPATIENT
Start: 2023-11-20 | End: 2023-11-20 | Stop reason: HOSPADM

## 2023-11-20 RX ORDER — SODIUM CHLORIDE 0.9 % (FLUSH) 0.9 %
5-40 SYRINGE (ML) INJECTION PRN
Status: DISCONTINUED | OUTPATIENT
Start: 2023-11-20 | End: 2023-11-20 | Stop reason: HOSPADM

## 2023-11-20 RX ORDER — VANCOMYCIN HYDROCHLORIDE 1 G/20ML
INJECTION, POWDER, LYOPHILIZED, FOR SOLUTION INTRAVENOUS PRN
Status: DISCONTINUED | OUTPATIENT
Start: 2023-11-20 | End: 2023-11-20 | Stop reason: HOSPADM

## 2023-11-20 RX ORDER — HYDROMORPHONE HYDROCHLORIDE 1 MG/ML
0.5 INJECTION, SOLUTION INTRAMUSCULAR; INTRAVENOUS; SUBCUTANEOUS EVERY 5 MIN PRN
Status: DISCONTINUED | OUTPATIENT
Start: 2023-11-20 | End: 2023-11-20 | Stop reason: HOSPADM

## 2023-11-20 RX ORDER — SODIUM CHLORIDE 0.9 % (FLUSH) 0.9 %
5-40 SYRINGE (ML) INJECTION PRN
Status: DISCONTINUED | OUTPATIENT
Start: 2023-11-20 | End: 2023-11-25 | Stop reason: HOSPADM

## 2023-11-20 RX ORDER — IPRATROPIUM BROMIDE AND ALBUTEROL SULFATE 2.5; .5 MG/3ML; MG/3ML
1 SOLUTION RESPIRATORY (INHALATION)
Status: DISCONTINUED | OUTPATIENT
Start: 2023-11-20 | End: 2023-11-20 | Stop reason: HOSPADM

## 2023-11-20 RX ORDER — ONDANSETRON 2 MG/ML
4 INJECTION INTRAMUSCULAR; INTRAVENOUS EVERY 6 HOURS PRN
Status: DISCONTINUED | OUTPATIENT
Start: 2023-11-20 | End: 2023-11-25 | Stop reason: HOSPADM

## 2023-11-20 RX ORDER — MELOXICAM 7.5 MG/1
3.75 TABLET ORAL DAILY PRN
Status: DISCONTINUED | OUTPATIENT
Start: 2023-11-20 | End: 2023-11-25 | Stop reason: HOSPADM

## 2023-11-20 RX ORDER — ONDANSETRON 2 MG/ML
INJECTION INTRAMUSCULAR; INTRAVENOUS PRN
Status: DISCONTINUED | OUTPATIENT
Start: 2023-11-20 | End: 2023-11-20 | Stop reason: SDUPTHER

## 2023-11-20 RX ORDER — SODIUM CHLORIDE 9 MG/ML
INJECTION, SOLUTION INTRAVENOUS PRN
Status: DISCONTINUED | OUTPATIENT
Start: 2023-11-20 | End: 2023-11-20 | Stop reason: HOSPADM

## 2023-11-20 RX ORDER — ROCURONIUM BROMIDE 10 MG/ML
INJECTION, SOLUTION INTRAVENOUS PRN
Status: DISCONTINUED | OUTPATIENT
Start: 2023-11-20 | End: 2023-11-20 | Stop reason: SDUPTHER

## 2023-11-20 RX ORDER — LORAZEPAM 2 MG/ML
0.5 INJECTION INTRAMUSCULAR
Status: DISCONTINUED | OUTPATIENT
Start: 2023-11-20 | End: 2023-11-20 | Stop reason: HOSPADM

## 2023-11-20 RX ORDER — SENNA AND DOCUSATE SODIUM 50; 8.6 MG/1; MG/1
1 TABLET, FILM COATED ORAL 2 TIMES DAILY
Status: DISCONTINUED | OUTPATIENT
Start: 2023-11-20 | End: 2023-11-25 | Stop reason: HOSPADM

## 2023-11-20 RX ORDER — LIDOCAINE HYDROCHLORIDE 20 MG/ML
INJECTION, SOLUTION INTRAVENOUS PRN
Status: DISCONTINUED | OUTPATIENT
Start: 2023-11-20 | End: 2023-11-20 | Stop reason: SDUPTHER

## 2023-11-20 RX ORDER — ACETAMINOPHEN 325 MG/1
650 TABLET ORAL
Status: DISCONTINUED | OUTPATIENT
Start: 2023-11-20 | End: 2023-11-20 | Stop reason: HOSPADM

## 2023-11-20 RX ORDER — HYDROMORPHONE HYDROCHLORIDE 1 MG/ML
0.25 INJECTION, SOLUTION INTRAMUSCULAR; INTRAVENOUS; SUBCUTANEOUS
Status: ACTIVE | OUTPATIENT
Start: 2023-11-20 | End: 2023-11-22

## 2023-11-20 RX ORDER — SODIUM CHLORIDE 0.9 % (FLUSH) 0.9 %
5-40 SYRINGE (ML) INJECTION EVERY 12 HOURS SCHEDULED
Status: DISCONTINUED | OUTPATIENT
Start: 2023-11-20 | End: 2023-11-25 | Stop reason: HOSPADM

## 2023-11-20 RX ORDER — SODIUM CHLORIDE 9 MG/ML
INJECTION, SOLUTION INTRAVENOUS PRN
Status: DISCONTINUED | OUTPATIENT
Start: 2023-11-20 | End: 2023-11-25 | Stop reason: HOSPADM

## 2023-11-20 RX ADMIN — ACETAMINOPHEN 1000 MG: 500 TABLET ORAL at 12:28

## 2023-11-20 RX ADMIN — DOCUSATE SODIUM 50 MG AND SENNOSIDES 8.6 MG 1 TABLET: 8.6; 5 TABLET, FILM COATED ORAL at 08:37

## 2023-11-20 RX ADMIN — SODIUM CHLORIDE, PRESERVATIVE FREE 10 ML: 5 INJECTION INTRAVENOUS at 08:37

## 2023-11-20 RX ADMIN — SODIUM CHLORIDE, PRESERVATIVE FREE 10 ML: 5 INJECTION INTRAVENOUS at 21:47

## 2023-11-20 RX ADMIN — FENTANYL CITRATE 50 MCG: 50 INJECTION, SOLUTION INTRAMUSCULAR; INTRAVENOUS at 18:18

## 2023-11-20 RX ADMIN — SUGAMMADEX 100 MG: 100 INJECTION, SOLUTION INTRAVENOUS at 19:11

## 2023-11-20 RX ADMIN — FENTANYL CITRATE 25 MCG: 50 INJECTION, SOLUTION INTRAMUSCULAR; INTRAVENOUS at 18:25

## 2023-11-20 RX ADMIN — TRANEXAMIC ACID 1 MG: 100 INJECTION, SOLUTION INTRAVENOUS at 18:38

## 2023-11-20 RX ADMIN — MIRTAZAPINE 7.5 MG: 15 TABLET, FILM COATED ORAL at 21:46

## 2023-11-20 RX ADMIN — ROCURONIUM BROMIDE 20 MG: 10 INJECTION, SOLUTION INTRAVENOUS at 18:12

## 2023-11-20 RX ADMIN — FENTANYL CITRATE 25 MCG: 50 INJECTION, SOLUTION INTRAMUSCULAR; INTRAVENOUS at 18:29

## 2023-11-20 RX ADMIN — SODIUM CHLORIDE: 9 INJECTION, SOLUTION INTRAVENOUS at 18:03

## 2023-11-20 RX ADMIN — PHENYLEPHRINE HYDROCHLORIDE 100 MCG: 10 INJECTION, SOLUTION INTRAMUSCULAR; INTRAVENOUS; SUBCUTANEOUS at 18:18

## 2023-11-20 RX ADMIN — METOPROLOL SUCCINATE 25 MG: 25 TABLET, EXTENDED RELEASE ORAL at 08:37

## 2023-11-20 RX ADMIN — PROPOFOL 50 MG: 10 INJECTION, EMULSION INTRAVENOUS at 18:29

## 2023-11-20 RX ADMIN — SODIUM CHLORIDE, POTASSIUM CHLORIDE, SODIUM LACTATE AND CALCIUM CHLORIDE: 600; 310; 30; 20 INJECTION, SOLUTION INTRAVENOUS at 21:45

## 2023-11-20 RX ADMIN — DOCUSATE SODIUM 50 MG AND SENNOSIDES 8.6 MG 1 TABLET: 8.6; 5 TABLET, FILM COATED ORAL at 21:45

## 2023-11-20 RX ADMIN — ONDANSETRON 4 MG: 2 INJECTION INTRAMUSCULAR; INTRAVENOUS at 19:11

## 2023-11-20 RX ADMIN — PROPOFOL 80 MG: 10 INJECTION, EMULSION INTRAVENOUS at 18:09

## 2023-11-20 RX ADMIN — LOSARTAN POTASSIUM 25 MG: 25 TABLET, FILM COATED ORAL at 21:46

## 2023-11-20 RX ADMIN — SODIUM CHLORIDE, PRESERVATIVE FREE 10 ML: 5 INJECTION INTRAVENOUS at 21:46

## 2023-11-20 RX ADMIN — Medication 100 MG: at 18:08

## 2023-11-20 RX ADMIN — ROCURONIUM BROMIDE 10 MG: 10 INJECTION, SOLUTION INTRAVENOUS at 18:09

## 2023-11-20 RX ADMIN — LIDOCAINE HYDROCHLORIDE 50 MG: 20 INJECTION, SOLUTION INTRAVENOUS at 18:09

## 2023-11-20 RX ADMIN — CEFAZOLIN SODIUM 2 G: 1 POWDER, FOR SOLUTION INTRAMUSCULAR; INTRAVENOUS at 18:18

## 2023-11-20 RX ADMIN — LOSARTAN POTASSIUM 25 MG: 25 TABLET, FILM COATED ORAL at 08:37

## 2023-11-20 ASSESSMENT — PAIN SCALES - GENERAL: PAINLEVEL_OUTOF10: 0

## 2023-11-20 NOTE — PROGRESS NOTES
Pt A&Ox4. VSS on 2L NC. No acute changes this shift. Pt uses  and has difficulty hearing. NPO since 0000. Pt refused all medication this evening. Pt stated she did not want to make any decisions without her son present. Pt education on use and importance of nightly medication. Pt voiding via purewick. No complaints of pain thus far. Denies needs at this time. Fall precautions in place, call light within reach.

## 2023-11-20 NOTE — ANESTHESIA PRE PROCEDURE
Department of Anesthesiology  Preprocedure Note       Name:  Kalyan Vasquez   Age:  80 y.o.  :  3/24/1932                                          MRN:  [id number]         Date:  2023      Surgeon: Paris Cazares):  Mavis Holly MD    Procedure: Procedure(s):  LEFT HIP HEMIARTHROPLASTY ANTERIOR APPROACH    Medications prior to admission:   Prior to Admission medications    Medication Sig Start Date End Date Taking? Authorizing Provider   metoprolol succinate (TOPROL XL) 25 MG extended release tablet Take 1 tablet by mouth daily 10/28/23  Yes Mae Buenrostro MD   mirtazapine (REMERON) 7.5 MG tablet Take 1 tablet by mouth nightly 10/31/23  Yes Mae Buenrostro MD   potassium chloride (KLOR-CON M) 20 MEQ extended release tablet Take 1 tablet by mouth daily 23  Yes Mae Buenrostro MD   ALPRAZolam (XANAX) 0.25 MG tablet Take 2 tablets by mouth nightly as needed.  10/21/22   Mae Buenrostro MD   apixaban (ELIQUIS) 2.5 MG TABS tablet Take 1 tablet by mouth 2 times daily 10/15/22   Mae Buenrostro MD   methocarbamol (ROBAXIN) 500 MG tablet Take 0.5 tablets by mouth in the morning and at bedtime 22   Mae Buenrostro MD   oxybutynin (DITROPAN-XL) 5 MG extended release tablet  22   Mae Buenrostro MD   losartan (COZAAR) 25 MG tablet Take 1 tablet by mouth 2 times daily 22   Temitope Hussein MD   nitroGLYCERIN (NITRODUR) 0.4 MG/HR UNWRAP AND APPLY 1 PATCH ONTO THE SKIN ONCE DAILY 22   Temitope Hussein MD   cyclobenzaprine (FLEXERIL) 10 mg tablet  22   Provider, Historical, MD   JUBLIA 10 % SOLN  22   Provider, Historical, MD   ZTLIDO 1.8 % [facility]  22   Mae Buenrostro MD   solifenacin (VESICARE) 5 MG tablet Take 10 mg by mouth daily  Patient not taking: Reported on 2022    Mae Buenrostro MD       Current medications:    Current Facility-Administered Medications   Medication Dose Route Frequency Provider

## 2023-11-20 NOTE — PLAN OF CARE
Problem: Discharge Planning  Goal: Discharge to home or other facility with appropriate resources  Outcome: Progressing     Problem: Pain  Goal: Verbalizes/displays adequate comfort level or baseline comfort level  11/20/2023 1035 by Carson French RN  Outcome: Progressing     Problem: Skin/Tissue Integrity  Goal: Absence of new skin breakdown  Description: 1. Monitor for areas of redness and/or skin breakdown  2. Assess vascular access sites hourly  3. Every 4-6 hours minimum:  Change oxygen saturation probe site  4. Every 4-6 hours:  If on nasal continuous positive airway pressure, respiratory therapy assess nares and determine need for appliance change or resting period.   Outcome: Progressing

## 2023-11-20 NOTE — DISCHARGE INSTRUCTIONS
Smiley Rosas MD  [facility] Office: [address] [facility], [address] [place] [address] Office: Francesco Escamilla, [address]  [address] ([address]) [place]    Dr. Ree Bolden Discharge Instructions  For daytime questions or concerns, please call my clinic: [phone]  For urgent questions after hours call or text me on my cell: [phone]  If I am unavailable, you can still call the clinic number which will connect you to the on call physician. Please take your blood thinner to prevent blood clots  Weight bearing as tolerated  Resume regular diet    Dressing/Wound Care: May remove outer dressing after 72 hours and replace with a clean dressing  May shower after 72 hours but do NOT submerge (I.e. no hot tubs, tub baths, pools, lakes, ocean ect). Avoid contact with dirty water. You may cover with a dry dressing or leave it open to the air. Keep it clean. Do not apply any cream or lotion to your incision  Please call immediately if any increasing redness or drainage, any fevers. For pain use the following combinations of medications. They work best together and are safe to take in combination as outlined below  -  Remember that swelling increases pain. When able, elevate, wrap with ACE bandage for compression, and use ice to help with swelling  -  Acetaminophen (Tylenol) - this is OK to take in addition to an anti-inflammatory. - Maximum dosing of 1000mg every 6-8 hours (max 3000mg per day)       - Avoid alcohol use as directed on the bottle  -  Anti-inflammatory options - select only one type of anti-inflammatory. All work equally well.  Use what you have available at home.       - Note: avoid this category of medications if you are taking blood thinners, have a history of stomach ulcers, or kidney disease       - Diclofenac - max dose 75mg twice daily (50mg if age over 72)       - Meloxicam - max dose up to 15mg daily (7.5mg if age over 72)       - Ibuprofen (motrin) - max dose up

## 2023-11-20 NOTE — CARE COORDINATION
CM following for discharge planning. Patient going to OR today for hip repair. Will need PT/OT post op. CM left voicemail for admissions at facility to inform pt would likely need skilled care  when she returns.     She Parker RN, BSN,    Ortho/Neuro   [phone]

## 2023-11-20 NOTE — PROGRESS NOTES
Patient alert to self. Speaks Mandarin and some English. Very hard of hearing so difficult to use iPad . Plans for patient to go to OR around 1715. Consent signed by son (POA). Son at bedside and agreeable with plan of care. Will continue to monitor.

## 2023-11-20 NOTE — PROGRESS NOTES
V2.0    [facility] Progress Note      Name:  Ryland Hashimoto /Age/Sex: 3/24/1932  (80 y.o. female)   MRN & CSN:  [id number] & [id number] Encounter Date/Time: 2023 11:05 AM EST   Location:  [facility]/5505-01 PCP: Roopa Pritchard MD     Attending:Oscar Polanco MD       Hospital Day: 3    Assessment and Recommendations   Ryland Hashimoto is a 80 y.o. female with pmh of atrial fibrillation on anticoagulation who presented with Displaced intertrochanteric fracture of left femur, initial encounter for closed fracture [facility])    Patient is a 80-year-old nursing home resident who presented to the emergency room with complaint of left hip pain after unwitnessed fall. X-rays demonstrated a displaced subcapital left femoral fracture. Plan:   Left hip fracture after fall-orthopedics consulted. Patient will require surgical intervention on . Surgery delayed secondary to Eliquis  Hypertension-continue home losartan  R-fci-[id number] Toprol, restart eliquis post-op    Patient to the OR today for left hip hemiarthroplasty. Diet Diet NPO Exceptions are: Sips of Water with Meds   DVT Prophylaxis [] Lovenox, []  Heparin, [] SCDs, [] Ambulation,  [] Eliquis, [] Xarelto  [] Coumadin   Code Status Full Code   Disposition From: Long term care  Expected Disposition: Skilled nursing facility  Estimated Date of Discharge: 2 to 3 days  Patient requires continued admission due to need for surgical intervention   Surrogate Decision Maker/ POA  Per EMR     Personally reviewed Lab Studies and Imaging   2023  CBC and BMP are ordered for tomorrow as the patient will be post-op    EKG interpreted personally and results demonstrated A-fib with no ischemic changes. Imaging that was interpreted personally includes x-rays of the left and results display subcapital fracture      On  I spent greater than 35 minutes involved in the care of the patient.    Subjective:     Chief Complaint: Left hip pain    Ryland Hashimoto is a

## 2023-11-20 NOTE — PLAN OF CARE
Problem: Pain  Goal: Verbalizes/displays adequate comfort level or baseline comfort level  Outcome: Progressing  Note: No complaints of pain thus far. Problem: Safety - Adult  Goal: Free from fall injury  Outcome: Progressing  Note: Fall precautions in place. Bed alarm on, bed in lowest position, call light within reach, non slip socks, hourly rounding.       Problem: ABCDS Injury Assessment  Goal: Absence of physical injury  Outcome: Progressing

## 2023-11-21 LAB
ANION GAP SERPL CALCULATED.3IONS-SCNC: 17 MMOL/L (ref 3–16)
BUN SERPL-MCNC: 46 MG/DL (ref 7–20)
CALCIUM SERPL-MCNC: 8.8 MG/DL (ref 8.3–10.6)
CHLORIDE SERPL-SCNC: 103 MMOL/L (ref 99–110)
CO2 SERPL-SCNC: 17 MMOL/L (ref 21–32)
CREAT SERPL-MCNC: 1.3 MG/DL (ref 0.6–1.2)
DEPRECATED RDW RBC AUTO: 15.3 % (ref 12.4–15.4)
GFR SERPLBLD CREATININE-BSD FMLA CKD-EPI: 39 ML/MIN/{1.73_M2}
GLUCOSE SERPL-MCNC: 137 MG/DL (ref 70–99)
HCT VFR BLD AUTO: 42.1 % (ref 36–48)
HGB BLD-MCNC: 13.9 G/DL (ref 12–16)
MCH RBC QN AUTO: 30.2 PG (ref 26–34)
MCHC RBC AUTO-ENTMCNC: 33 G/DL (ref 31–36)
MCV RBC AUTO: 91.4 FL (ref 80–100)
PLATELET # BLD AUTO: 87 K/UL (ref 135–450)
PLATELET BLD QL SMEAR: ABNORMAL
PMV BLD AUTO: 9.7 FL (ref 5–10.5)
POTASSIUM SERPL-SCNC: 4.2 MMOL/L (ref 3.5–5.1)
RBC # BLD AUTO: 4.61 M/UL (ref 4–5.2)
SODIUM SERPL-SCNC: 137 MMOL/L (ref 136–145)
WBC # BLD AUTO: 10.1 K/UL (ref 4–11)

## 2023-11-21 PROCEDURE — 36415 COLL VENOUS BLD VENIPUNCTURE: CPT

## 2023-11-21 PROCEDURE — 1200000000 HC SEMI PRIVATE

## 2023-11-21 PROCEDURE — 51798 US URINE CAPACITY MEASURE: CPT

## 2023-11-21 PROCEDURE — 97530 THERAPEUTIC ACTIVITIES: CPT

## 2023-11-21 PROCEDURE — 85027 COMPLETE CBC AUTOMATED: CPT

## 2023-11-21 PROCEDURE — 97163 PT EVAL HIGH COMPLEX 45 MIN: CPT

## 2023-11-21 PROCEDURE — 6370000000 HC RX 637 (ALT 250 FOR IP): Performed by: ORTHOPAEDIC SURGERY

## 2023-11-21 PROCEDURE — 97535 SELF CARE MNGMENT TRAINING: CPT

## 2023-11-21 PROCEDURE — 80048 BASIC METABOLIC PNL TOTAL CA: CPT

## 2023-11-21 PROCEDURE — 2580000003 HC RX 258: Performed by: ORTHOPAEDIC SURGERY

## 2023-11-21 PROCEDURE — 97116 GAIT TRAINING THERAPY: CPT

## 2023-11-21 PROCEDURE — 6360000002 HC RX W HCPCS: Performed by: ORTHOPAEDIC SURGERY

## 2023-11-21 PROCEDURE — 97166 OT EVAL MOD COMPLEX 45 MIN: CPT

## 2023-11-21 RX ADMIN — ENOXAPARIN SODIUM 40 MG: 100 INJECTION SUBCUTANEOUS at 09:50

## 2023-11-21 RX ADMIN — DOCUSATE SODIUM 50 MG AND SENNOSIDES 8.6 MG 1 TABLET: 8.6; 5 TABLET, FILM COATED ORAL at 09:50

## 2023-11-21 RX ADMIN — LOSARTAN POTASSIUM 25 MG: 25 TABLET, FILM COATED ORAL at 09:50

## 2023-11-21 RX ADMIN — HYDROMORPHONE HYDROCHLORIDE 0.5 MG: 1 INJECTION, SOLUTION INTRAMUSCULAR; INTRAVENOUS; SUBCUTANEOUS at 06:35

## 2023-11-21 RX ADMIN — SODIUM CHLORIDE, PRESERVATIVE FREE 10 ML: 5 INJECTION INTRAVENOUS at 19:40

## 2023-11-21 RX ADMIN — SODIUM CHLORIDE 2000 MG: 900 INJECTION INTRAVENOUS at 09:59

## 2023-11-21 RX ADMIN — METOPROLOL SUCCINATE 25 MG: 25 TABLET, EXTENDED RELEASE ORAL at 09:50

## 2023-11-21 RX ADMIN — SODIUM CHLORIDE 2000 MG: 900 INJECTION INTRAVENOUS at 02:05

## 2023-11-21 ASSESSMENT — PAIN SCALES - GENERAL
PAINLEVEL_OUTOF10: 5
PAINLEVEL_OUTOF10: 7

## 2023-11-21 ASSESSMENT — PAIN - FUNCTIONAL ASSESSMENT: PAIN_FUNCTIONAL_ASSESSMENT: ACTIVITIES ARE NOT PREVENTED

## 2023-11-21 ASSESSMENT — PAIN DESCRIPTION - LOCATION: LOCATION: HIP

## 2023-11-21 ASSESSMENT — PAIN DESCRIPTION - ONSET: ONSET: ON-GOING

## 2023-11-21 ASSESSMENT — PAIN DESCRIPTION - ORIENTATION: ORIENTATION: LEFT

## 2023-11-21 ASSESSMENT — PAIN DESCRIPTION - PAIN TYPE: TYPE: SURGICAL PAIN

## 2023-11-21 ASSESSMENT — PAIN DESCRIPTION - FREQUENCY: FREQUENCY: CONTINUOUS

## 2023-11-21 ASSESSMENT — PAIN DESCRIPTION - DESCRIPTORS: DESCRIPTORS: ACHING

## 2023-11-21 NOTE — PROGRESS NOTES
PDMP reviewed & no abnormal activity; has f/u scheduled, script sent.     Patients LUE swollen and pale. Cold to the touch. Radial pulses +2. Pt denies any pain but c/o of some numbness to pointer finger to her son. Secure chat sent to MD. Awaiting new orders.

## 2023-11-21 NOTE — PROGRESS NOTES
Occupational Therapy  Facility/Department: [facility] 5T ORTHO/NEURO  Occupational Therapy Initial Assessment and Treatment Note     Name: Tyrell Thomas  : 3/24/1932  MRN: [id number]  Date of Service: 2023    Discharge Recommendations:Bob Mann scored a  on the AM-PAC ADL Inpatient form. Current research shows that an AM-PAC score of 17 or less is typically not associated with a discharge to the patient's home setting. Based on the patient's AM-PAC score and their current ADL deficits, it is recommended that the patient have 3-5 sessions per week of Occupational Therapy at d/c to increase the patient's independence. Please see assessment section for further patient specific details. If patient discharges prior to next session this note will serve as a discharge summary. Please see below for the latest assessment towards goals. OT Equipment Recommendations  Equipment Needed: No  Other: defer to [facility] care facility       Patient Diagnosis(es): The primary encounter diagnosis was Fall, initial encounter. A diagnosis of Hip fracture requiring operative repair, left, closed, initial encounter [facility]) was also pertinent to this visit. Past Medical History:  has a past medical history of Atrial fibrillation ([address]) and Hypertension. Past Surgical History:  has a past surgical history that includes Hysterectomy and hip surgery (Left, 2023). Treatment Diagnosis: impaired ADLs/ functional transfers 2/2 fall + L hip hemiarthroplasty      Assessment   Performance deficits / Impairments: Decreased functional mobility ; Decreased endurance;Decreased ADL status; Decreased safe awareness  Assessment: Pt from LTC at [facility]- at baseline mostly independent with mobility and simple ADLs. Pt currently functioning well below baseline level. Needing increased increased assist for all mobility and ADLs this date.   Pt would benefit from ongoing inpt OT at d/c to maximize functional level   Will follow walker)  Transfer Assistance: Independent  Occupation: Retired  Leisure & Hobbies: IPAD / videos, exercises       Objective Treatment included functional transfer training, ADL's and pt. education. Safety Devices  Type of Devices: Call light within reach; Chair alarm in place;Nurse notified; Left in chair (ice to L hip)     Toilet Transfers  Toilet - Technique: Stand step  Equipment Used: Standard bedside commode  Toilet Transfer: Moderate assistance;2 Person assistance  Toilet Transfers Comments: simulated  AROM: Generally decreased, functional  Strength: Generally decreased, functional  Coordination: Generally decreased, functional  Tone: Normal  Sensation: Intact  ADL  Feeding: Minimal assistance;Setup;Verbal cueing; Increased time to complete  Grooming: Minimal assistance;Verbal cueing;Setup; Increased time to complete  LE Dressing: Dependent/Total  LE Dressing Skilled Clinical Factors: unable to attempt 2/2 pain with bending  Toileting: Dependent/Total;Maximum assistance  Toileting Skilled Clinical Factors: currently using purewick      Transfers  Sit to stand: Moderate assistance;2 Person assistance  Stand to sit: Moderate assistance;2 Person assistance  Transfer Comments: from EOB. Pt demo 2 trials -- stand step 2-3 bed to chair with Mod A x 2 with walker. Pt limited by fatigue  Vision  Vision: Impaired  Vision Exceptions: Wears glasses for reading  Hearing  Hearing: Exceptions to [facility]  Hearing Exceptions: Hard of hearing/hearing concerns  Cognition  Overall Cognitive Status: Exceptions  Arousal/Alertness: Appropriate responses to stimuli  Following Commands: Follows one step commands with repetition  Insights: Decreased awareness of deficits  Initiation: Requires cues for some  Sequencing: Requires cues for some  Cognition Comment: Pt's son is acting as inter. unable to fully assess.   pt has STM issues at baseline  Orientation  Overall Orientation Status: Impaired  Orientation Level: Oriented to person

## 2023-11-21 NOTE — PROGRESS NOTES
V2.0    [facility] Progress Note      Name:  Nelda Zambrano /Age/Sex: 3/24/1932  (80 y.o. female)   MRN & CSN:  [id number] & [id number] Encounter Date/Time: 2023 11:05 AM EST   Location:  [facility] PCP: Albin Valentin MD     Attending:Naveen Chahal MD       Hospital Day: 4    Assessment and Recommendations   Nelda Zambrano is a 80 y.o. female with pmh of atrial fibrillation on anticoagulation who presented with Displaced intertrochanteric fracture of left femur, initial encounter for closed fracture [facility])    Patient is a 44-year-old nursing home resident who presented to the emergency room with complaint of left hip pain after unwitnessed fall. X-rays demonstrated a displaced subcapital left femoral fracture. Plan:   Left hip fracture after fall-orthopedics consulted. S/p left hip hemiarthroplasty . Postsurgical x-ray with satisfactory alignment of the total left hip prosthesis. Continue on Lovenox for 30 days. Weightbearing as tolerated PT for therapy she was evaluated this morning and did poorly; pain controlled DC fluids monitor intake okay to discharge tomorrow  Hypertension-continue home losartan  H-[id number] Toprol, restart eliquis post-op; per orthopedic note patient to remain on Lovenox for 30 days; will clarify      Diet ADULT DIET; Regular; 4 carb choices (60 gm/meal)   DVT Prophylaxis [] Lovenox, []  Heparin, [] SCDs, [] Ambulation,  [] Eliquis, [] Xarelto  [] Coumadin   Code Status Full Code   Disposition From: Long term care  Expected Disposition: Skilled nursing facility  Estimated Date of Discharge: tomorrow  Patient requires continued admission due to need for surgical intervention   Surrogate Decision Maker/ POA  Per EMR     Personally reviewed Lab Studies and Imaging   2023  CBC and BMP today unremarkable. H&H stable at 13.9/42.1 creatinine 1.3 elevated from 0.7  EKG interpreted personally and results demonstrated A-fib with no ischemic changes.     Imaging that

## 2023-11-21 NOTE — PROGRESS NOTES
Patient arrived to PACU post LEFT HIP HEMIARTHROPLASTY ANTERIOR APPROACH - Left with Dr. Elijah Hahn. VSS on arrival. CRNA gave PACU RN report at bedside stating no complications during procedure. Dressing to surgical site clean, dry and intact. Patient shows no signs of pain at this time. Will continue to monitor.

## 2023-11-21 NOTE — PROGRESS NOTES
Pt is alert and oriented x1 to self. VSS on 2L NC. Pt voiding adequately via external cath. Pain managed with rest, and medications per MAR. Fall precautions in place. Call light is within pt's reach. Plan of care is ongoing.

## 2023-11-21 NOTE — PLAN OF CARE
Problem: Discharge Planning  Goal: Discharge to home or other facility with appropriate resources  11/21/2023 1136 by Carson French RN  Outcome: Progressing     Problem: Pain  Goal: Verbalizes/displays adequate comfort level or baseline comfort level  11/21/2023 1136 by Carson French RN  Outcome: Progressing     Problem: Skin/Tissue Integrity  Goal: Absence of new skin breakdown  Description: 1. Monitor for areas of redness and/or skin breakdown  2. Assess vascular access sites hourly  3. Every 4-6 hours minimum:  Change oxygen saturation probe site  4. Every 4-6 hours:  If on nasal continuous positive airway pressure, respiratory therapy assess nares and determine need for appliance change or resting period.   Outcome: Progressing

## 2023-11-21 NOTE — OP NOTE
Bri Hussein MD  [facility] Office: [address] [facility], [address] [place] [address] Office: [place] [id number] [place]        Hemiarthroplasty for Hip Fracture     Patient Name: Nelda Zambrano  : 3/24/1932  Phone: [phone]  Email: [email or web address]    PCP: Albin Valentin MD    [facility]  Surgery Date: 2023      OPERATIVE REPORT    PRE-OP DIAGNOSIS: LEFT Femoral Neck Fracture, Closed, Displaced       POST-OP DIAGNOSIS: Same    PROCEDURE: LEFT Hip Hemiarthroplasty for fracture (CPT 10832)    SURGEON: Jo Ann Espinal MD    ASSISTANT:  Surgical Assistant: Winifred Benjamin. Needed for reduction, limb manipulation, tissue retraction, and wound closure as indicated. ANESTHESIA: General with Intra-op field block    ANTIBIOTIC: CEFAZOLIN, DOSING PER NURSING CHART, 1g vancomycin incisional    ESTIMATED BLOOD LOSS:  193TT    COMPLICATIONS:  None    IMPLANTS USED:     Avenir Size #7 Standard Offset Cemented stem   Bipolar Femoral head size 45 OD, Inner metal head 28mm + -3.5mm    * No implants in log *    HISTORY OF PRESENT ILLNESS:  The patient is a(n) 80 y.o. female with a history of a left displaced femoral neck fracturewas admitted to the hospital after the injury. Internal medicine evaluated the patient, and work up revealed the above described condition. Orthopaedics was consulted, and options were discussed. RISKS OF SURGERY:  I had a detailed discussion with the patient  and family prior to surgery. We discussed treatment options including expectations with non-operative treatment. I offered left hip hemiarthroplasty given her prior activity level to restore adequate limb stability/mobility and give the best chance for long term optimal function. We discussed the details the procedure including surgical plan and reasonable expectations for post-operative rehab and recovery.   We discussed potential complications including bleeding, infection, damage to surrounding structures including nerves or blood vessels, need for future procedures, stiffness, wound healing complications, hardware related complications or failure, implant loosening, instability, leg length discrepancy, and intra-operative fracture. In addition, in rare cases the patient may have loss of a limb or even death. They are aware that this is not an all-inclusive list. No guarantee of any particular results were given. The patient understands that in some cases surgery can make them worse. No part of the procedure does the patient refuse. We had ample opportunities for questions regarding the usual outcomes. Clear understanding was expressed and informed consent was obtained. They would like to proceed as planned. DETAILS OF PROCEDURE: The patient was given the appropriate preoperative antibiotics, brought into the operating room, and placed in the supine position on the traction table. The operative extremity was prepped and draped in the standard sterile fashion. A timeout was performed in which the appropriate side, site, procedure, patient, antibiotic administration, and implants were confirmed. Anterior approach to the hip was utilized incision, starting lateral and distal to the ASIS towards the lateral knee. Subcutaneous tissues were dissected with care to preserve any branches of the lateral femoral cutaneous nerve. Fascia of the TFL was incised sharply, TFL was  from the anterior fascia. Lateral circumflex vessels were identified and coagulated using electrocautery and/or bipolar. Fascia was incised. Indirect head of the rectus was  from the capsule and preserved. Pericapsular fat was removed. Capsulectomy was performed with care to preserve the labrum. Retractors were placed on either side of the femoral neck. Hematoma was evacuated. Nosteotomy was freshened and some fragments removed.  Traction was briefly applied to remove the remaining

## 2023-11-21 NOTE — PLAN OF CARE
Problem: Discharge Planning  Goal: Discharge to home or other facility with appropriate resources  11/20/2023 2359 by Ernie Meyers RN  Outcome: Progressing   Pt is active in discharge planning. Problem: Pain  Goal: Verbalizes/displays adequate comfort level or baseline comfort level  11/20/2023 2359 by Ernie Meyers RN  Outcome: Progressing   Pain is managed with rest, and medications per [facility]. Problem: Safety - Adult  Goal: Free from fall injury  Outcome: Progressing   Fall precautions in place. Bed alarm on. Bed locked in low position.

## 2023-11-21 NOTE — CARE COORDINATION
Left a message on [employer] voice mail about the IMM and left a copy of IMM in patients room. Also left my phone number for any questions.

## 2023-11-21 NOTE — PROGRESS NOTES
Patient alert to only self. C/o pain with movement, comfortable at rest. Dressing to left hip clean dry and intact. Denies any numbness or tingling to LLE. Patient urinating adequately via purewick. Fall precautions in place and call light within reach. Will continue to monitor.

## 2023-11-21 NOTE — PROGRESS NOTES
4 Eyes Skin Assessment     NAME:  Getachew Mendoza  YOB: 1932  MEDICAL RECORD NUMBER:  [id number]    The patient is being assessed for  Post-Op Surgical    I agree that at least one RN has performed a thorough Head to Toe Skin Assessment on the patient. ALL assessment sites listed below have been assessed. Areas assessed by both nurses:    Head, Face, Ears, Shoulders, Back, Chest, Arms, Elbows, Hands, Sacrum. Buttock, Coccyx, Ischium, Legs. Feet and Heels, and Under Medical Devices         Does the Patient have a Wound?  No noted wound(s)    Incision L hip      Malachi Prevention initiated by RN: Yes  Wound Care Orders initiated by RN: No    Pressure Injury (Stage 3,4, Unstageable, DTI, NWPT, and Complex wounds) if present, place Wound referral order by RN under : No    New Ostomies, if present place, Ostomy referral order under : No     Nurse 1 eSignature: Electronically signed by Vladimir Chung RN on 11/20/23 at 11:40 PM EST    **SHARE this note so that the co-signing nurse can place an eSignature**    Nurse 2 eSignature: {Esignature:[id number]}

## 2023-11-21 NOTE — PROGRESS NOTES
Physical Therapy  Facility/Department: [facility] 5T ORTHO/NEURO  Physical Therapy Initial Assessment / Treatment    Name: An Fong  : 3/24/1932  MRN: [id number]  Date of Service: 2023    Discharge Recommendations: An Fong scored a  on the AM-PAC short mobility form. Current research shows that an AM-PAC score of 17 or less is typically not associated with a discharge to the patient's home setting. Based on the patient's AM-PAC score and their current functional mobility deficits, it is recommended that the patient have 3-5 sessions per week of Physical Therapy at d/c to increase the patient's independence. Please see assessment section for further patient specific details. If patient discharges prior to next session this note will serve as a discharge summary. Please see below for the latest assessment towards goals. PT Equipment Recommendations  Equipment Needed: No  Other: defer to next level of care      Patient Diagnosis(es): The primary encounter diagnosis was Fall, initial encounter. A diagnosis of Hip fracture requiring operative repair, left, closed, initial encounter [facility]) was also pertinent to this visit. Past Medical History:  has a past medical history of Atrial fibrillation ([address]) and Hypertension. Past Surgical History:  has a past surgical history that includes Hysterectomy and hip surgery (Left, 2023). Assessment   Body Structures, Functions, Activity Limitations Requiring Skilled Therapeutic Intervention: Decreased functional mobility   Assessment: Pt is 80 y.o. female POD #1 s/p L hip hemiarthroplasty. Pt is from nursing facility and typically ambulates independently with occasional use of walker. Today, pt requires 2 person assist for all mobility. Mobility limited by pain, weakness, language barrier - attempts to use video  were unsuccessful. Pt demos good effort and participation. Rec SNF at d/c.   Will follow  Treatment Diagnosis: reps L LE with min to mod A, limited due to fatigue      Vitals  Pt found with nasal cannula lying in bed, set to 3L. O2 sats 93%, HR: 111bpm, BP: 117/81  Sitting EOB: 119/72  After 4-5 min seated in chair: 124/77, HR 122bpm, O2 sats 88% on RA. 2L applied with O2 sats increasing to 94%. Discussed with RN. OutComes Score                                                  AM-PAC Score  AM-PAC Inpatient Mobility Raw Score : 6 (11/21/23 1046)  AM-PAC Inpatient T-Scale Score : 23.55 (11/21/23 1046)  Mobility Inpatient CMS 0-100% Score: 100 (11/21/23 1046)  Mobility Inpatient CMS G-Code Modifier : CN (11/21/23 1046)          Tinneti Score       Goals  Short Term Goals  Time Frame for Short Term Goals: discharge  Short Term Goal 1: Pt will transfer supine <--> sit with min A x 1  Short Term Goal 2: Pt will transfer sit <--> stand with min A x 1  Short Term Goal 3: Pt will amb 50 ft with RW and min A x 1  Short Term Goal 4: Pt will demo 10 reps LE HEP with min cues  Short Term Goal 5: Pt will demo good understanding of anterior hip precautions  Patient Goals   Patient Goals : pt does not state, son states goal is to return to walking       Education  Patient Education  Education Given To: Patient  Education Provided: Role of Therapy  Education Provided Comments: WBAT; anterior hip precautions  Education Method: Demonstration  Barriers to Learning: Cognition; Other (Comment); Hearing  Education Outcome: Continued education needed  Pt's son was also educated on WBAT, anterior hip precautions - written instructions left in room. Son verbalized good understanding. Therapy Time   Individual Concurrent Group Co-treatment   Time In 0827         Time Out 0922         Minutes 55                 Timed Code Treatment Minutes:  40    Total Treatment Minutes:  54    If patient is discharged prior to next treatment, this note will serve as the discharge summary.   Shante Rose, PT, DPT  [id number]

## 2023-11-21 NOTE — CARE COORDINATION
CM spoke with [facility] with [employer]. They are starting pre-cert for pt to return skilled. Patient can return when medically stable, does not have to wait for pre-cert.     Nawaf Miranda RN, BSN,    Ortho/Neuro   [phone]

## 2023-11-21 NOTE — ANESTHESIA POSTPROCEDURE EVALUATION
Department of Anesthesiology  Postprocedure Note    Patient: Nirav Dupree  MRN: [id number]  YOB: 1932  Date of evaluation: 11/20/2023      Procedure Summary     Date: 11/20/23 Room / Location: [address] [address] Count includes the [facility]    Anesthesia Start: 1803 Anesthesia Stop: 1927    Procedure: LEFT HIP HEMIARTHROPLASTY ANTERIOR APPROACH (Left) Diagnosis:       Hip fracture requiring operative repair, left, closed, initial encounter ([address])      (Hip fracture requiring operative repair, left, closed, initial encounter ([address]) Maricarmen Rodrigues)    Surgeons: Bryan Flores MD Responsible Provider: Albertina Guillen MD    Anesthesia Type: general ASA Status: 4 - Emergent          Anesthesia Type: No value filed.     Cesilia Phase I:      Cesilia Phase II:        Anesthesia Post Evaluation    Patient location during evaluation: PACU  Level of consciousness: awake  Complications: no  Multimodal analgesia pain management approach

## 2023-11-22 PROCEDURE — 92610 EVALUATE SWALLOWING FUNCTION: CPT

## 2023-11-22 PROCEDURE — 97535 SELF CARE MNGMENT TRAINING: CPT

## 2023-11-22 PROCEDURE — 97530 THERAPEUTIC ACTIVITIES: CPT

## 2023-11-22 PROCEDURE — 6370000000 HC RX 637 (ALT 250 FOR IP): Performed by: NURSE PRACTITIONER

## 2023-11-22 PROCEDURE — 93971 EXTREMITY STUDY: CPT

## 2023-11-22 PROCEDURE — 51798 US URINE CAPACITY MEASURE: CPT

## 2023-11-22 PROCEDURE — 6370000000 HC RX 637 (ALT 250 FOR IP): Performed by: ORTHOPAEDIC SURGERY

## 2023-11-22 PROCEDURE — 2580000003 HC RX 258: Performed by: ORTHOPAEDIC SURGERY

## 2023-11-22 PROCEDURE — 97116 GAIT TRAINING THERAPY: CPT

## 2023-11-22 PROCEDURE — 51701 INSERT BLADDER CATHETER: CPT

## 2023-11-22 PROCEDURE — 1200000000 HC SEMI PRIVATE

## 2023-11-22 PROCEDURE — 6360000002 HC RX W HCPCS: Performed by: ORTHOPAEDIC SURGERY

## 2023-11-22 RX ORDER — TAMSULOSIN HYDROCHLORIDE 0.4 MG/1
0.4 CAPSULE ORAL DAILY
Status: DISCONTINUED | OUTPATIENT
Start: 2023-11-22 | End: 2023-11-25 | Stop reason: HOSPADM

## 2023-11-22 RX ORDER — ENOXAPARIN SODIUM 100 MG/ML
30 INJECTION SUBCUTANEOUS DAILY
Status: DISCONTINUED | OUTPATIENT
Start: 2023-11-23 | End: 2023-11-23

## 2023-11-22 RX ADMIN — METOPROLOL SUCCINATE 25 MG: 25 TABLET, EXTENDED RELEASE ORAL at 09:45

## 2023-11-22 RX ADMIN — LOSARTAN POTASSIUM 25 MG: 25 TABLET, FILM COATED ORAL at 09:45

## 2023-11-22 RX ADMIN — DOCUSATE SODIUM 50 MG AND SENNOSIDES 8.6 MG 1 TABLET: 8.6; 5 TABLET, FILM COATED ORAL at 21:32

## 2023-11-22 RX ADMIN — TAMSULOSIN HYDROCHLORIDE 0.4 MG: 0.4 CAPSULE ORAL at 19:32

## 2023-11-22 RX ADMIN — SODIUM CHLORIDE, PRESERVATIVE FREE 10 ML: 5 INJECTION INTRAVENOUS at 09:48

## 2023-11-22 RX ADMIN — ENOXAPARIN SODIUM 40 MG: 100 INJECTION SUBCUTANEOUS at 09:45

## 2023-11-22 RX ADMIN — SODIUM CHLORIDE, PRESERVATIVE FREE 10 ML: 5 INJECTION INTRAVENOUS at 09:49

## 2023-11-22 RX ADMIN — SODIUM CHLORIDE, PRESERVATIVE FREE 10 ML: 5 INJECTION INTRAVENOUS at 21:32

## 2023-11-22 RX ADMIN — LOSARTAN POTASSIUM 25 MG: 25 TABLET, FILM COATED ORAL at 21:32

## 2023-11-22 RX ADMIN — MIRTAZAPINE 7.5 MG: 15 TABLET, FILM COATED ORAL at 21:31

## 2023-11-22 RX ADMIN — POLYETHYLENE GLYCOL 3350 17 G: 17 POWDER, FOR SOLUTION ORAL at 09:47

## 2023-11-22 NOTE — PROGRESS NOTES
Pharmacist Review and Automatic Dose Adjustment of Prophylactic Enoxaparin    The reviewing pharmacist has made an adjustment to the ordered enoxaparin dose or converted to UFH per the approved [facility] protocol and table as defined below. Plan / Rationale: Based upon the patient's weight and renal function, the ordered dose of enoxaparin 40 mg subQ daily has been converted to 30 mg subQ daily. Please call with questions--  Luisa Donnelly PharmD, [facility]  Wireless: [phone]   11/22/2023 9:49 AM        Pily Mendiola is a 80 y.o. female. Recent Labs     11/21/23  0629   CREATININE 1.3*       Estimated Creatinine Clearance: 23 mL/min (A) (based on SCr of 1.3 mg/dL (H)). Recent Labs     11/21/23  0629   HGB 13.9   HCT 42.1   PLT 87*     No results for input(s): \"INR\" in the last 72 hours.     Height:   Ht Readings from Last 1 Encounters:   11/18/23 1.626 m (5' 4\")     Weight:  Wt Readings from Last 1 Encounters:   11/18/23 50.8 kg (112 lb)

## 2023-11-22 NOTE — PROGRESS NOTES
Patient lethargic this shift. Opens eyes to voice, but is very hard of hearing, so hard to get accurate assessment due to hearing impairment and language barrier. Wakes up when repositioning and denies pain when asked pain level by this RN. Dressing to left hip is clean, dry and intact. Patient voided 200 ml overnight via purewick. Bladder scan this morning= 496 ml. One time order to straight cath placed per MD. Straight cath completed, 400 ml yuliya, clear and malodorous urine drained. VSS on room air. Remains Afib on tele. All fall precautions and safety measures are in place, will continue to monitor.

## 2023-11-22 NOTE — PROGRESS NOTES
Vascular called to report a L cephalic superficial venous thrombosis seen during LUE venous ultrasound. Josi Butt NP made aware via secure message.

## 2023-11-22 NOTE — PLAN OF CARE
Problem: Discharge Planning  Goal: Discharge to home or other facility with appropriate resources  11/21/2023 2326 by Anthony Pearce RN  Outcome: Progressing  Flowsheets (Taken 11/21/2023 2326)  Discharge to home or other facility with appropriate resources:   Identify barriers to discharge with patient and caregiver   Arrange for needed discharge resources and transportation as appropriate  11/21/2023 1136 by Lakeisha Robert RN  Outcome: Progressing     Problem: Pain  Goal: Verbalizes/displays adequate comfort level or baseline comfort level  11/21/2023 2326 by Anthony Pearce RN  Outcome: Progressing  Flowsheets (Taken 11/21/2023 2326)  Verbalizes/displays adequate comfort level or baseline comfort level:   Encourage patient to monitor pain and request assistance   Assess pain using appropriate pain scale   Administer analgesics based on type and severity of pain and evaluate response   Implement non-pharmacological measures as appropriate and evaluate response   Consider cultural and social influences on pain and pain management   Notify Licensed Independent Practitioner if interventions unsuccessful or patient reports new pain       Problem: Skin/Tissue Integrity  Goal: Absence of new skin breakdown  Description: 1. Monitor for areas of redness and/or skin breakdown  2. Assess vascular access sites hourly  3. Every 4-6 hours minimum:  Change oxygen saturation probe site  4. Every 4-6 hours:  If on nasal continuous positive airway pressure, respiratory therapy assess nares and determine need for appliance change or resting period.   11/21/2023 2326 by Anthony Pearce RN  Outcome: Progressing       Problem: Safety - Adult  Goal: Free from fall injury  Outcome: Progressing  Flowsheets (Taken 11/21/2023 2326)  Free From Fall Injury:   Instruct family/caregiver on patient safety   Based on caregiver fall risk screen, instruct family/caregiver to ask for assistance with transferring infant if caregiver noted to have fall risk factors     Problem: ABCDS Injury Assessment  Goal: Absence of physical injury  Outcome: Progressing  Flowsheets (Taken 11/21/2023 2326)  Absence of Physical Injury: Implement safety measures based on patient assessment     Problem: Confusion  Goal: Confusion, delirium, dementia, or psychosis is improved or at baseline  Description: INTERVENTIONS:  1. Assess for possible contributors to thought disturbance, including medications, impaired vision or hearing, underlying metabolic abnormalities, dehydration, psychiatric diagnoses, and notify attending LIP  2. [place] high risk fall precautions, as indicated  3. Provide frequent short contacts to provide reality reorientation, refocusing and direction  4. Decrease environmental stimuli, including noise as appropriate  5. Monitor and intervene to maintain adequate nutrition, hydration, elimination, sleep and activity  6. If unable to ensure safety without constant attention obtain sitter and review sitter guidelines with assigned personnel  7.  Initiate Psychosocial CNS and Spiritual Care consult, as indicated  Outcome: Progressing  Flowsheets (Taken 11/21/2023 2326)  Effect of thought disturbance (confusion, delirium, dementia, or psychosis) are managed with adequate functional status:   Assess for contributors to thought disturbance, including medications, impaired vision or hearing, underlying metabolic abnormalities, dehydration, psychiatric diagnoses, notify [address] high risk fall precautions, as indicated   Decrease environmental stimuli, including noise as appropriate

## 2023-11-22 NOTE — PLAN OF CARE
Problem: Pain  Goal: Verbalizes/displays adequate comfort level or baseline comfort level  Outcome: Progressing  Flowsheets (Taken 11/22/2023 1718)  Verbalizes/displays adequate comfort level or baseline comfort level:   Encourage patient to monitor pain and request assistance   Assess pain using appropriate pain scale   Administer analgesics based on type and severity of pain and evaluate response   Implement non-pharmacological measures as appropriate and evaluate response  Note: Endorses pain only with movement. PRN dilaudid and oxy available per the [facility]. Patient educated on pain medications, side effects, and verbalized understanding. Ice packs used for non-pharmacological pain relief. Assisted with turning and repositioning for comfort and pressure relief. Notified of pain medication administration schedule. Plan of care continues. Problem: Skin/Tissue Integrity  Goal: Absence of new skin breakdown  Description: 1. Monitor for areas of redness and/or skin breakdown  2. Assess vascular access sites hourly  3. Every 4-6 hours minimum:  Change oxygen saturation probe site  4. Every 4-6 hours:  If on nasal continuous positive airway pressure, respiratory therapy assess nares and determine need for appliance change or resting period. Outcome: Progressing  Note: On specialty mattress. Q2 turning and repositioning. Heels and elbows elevated off bed surface on pillows. Leslye care completed upon voiding. Sacral heart in place to coccyx. Plan of care continues. Problem: Safety - Adult  Goal: Free from fall injury  Outcome: Progressing  Note: Fall precautions in place. Non-skid socks on. Bed locked in lowest position with alarm on and side rails up. Bedside table, belongings, and call light within reach. Hourly rounding in anticipation of patient needs. Floor clean and free from clutter. Room door open. Plan of care continues.

## 2023-11-22 NOTE — PROGRESS NOTES
Occupational Therapy  No Treatment    Two attempts made for therapy this AM. First attempt son requesting therapy to wait due to pt nausea. Second attempt UE dopplers being completed. Will attempt to see pt later as schedule allows. Continue OT per POC.      [address] PROCTOR/L

## 2023-11-22 NOTE — PROGRESS NOTES
Physician Progress Note      PATIENT:               Alex Rojas  CSN #:                  [id number]  :                       3/24/1932  ADMIT DATE:       2023 7:40 PM  [facility] DATE:  Serenity Mueller  PROVIDER #:        Darcy Rodriguez MD          QUERY TEXT:    Pt admitted  with Left hip fx and underwent ORIF on . Preop   creatinine 0.7 on . Postop creatiniine1.3 on . If possible, please   document in the progress notes and discharge summary if you are evaluating   and/or treating any of the following: The medical record reflects the following:  Risk Factors: 80 y.o. female with PMH HTN, A-fib  Clinical Indicators:   Preop creatinine 0.7 on . Postop creatiniine1.3   on   Treatment: IVF    Defined by Kidney Disease Improving Global Outcomes (KDIGO) clinical practice   guideline for acute kidney injury:  -Increase in SCr by greater than or equal to 0.3 mg/dl within 48 hours; or  -Increase or decrease in SCr to greater than or equal to 1.5 times baseline,   which is known or presumed to have occurred within the prior 7 days; or  -Urine volume < 0.5ml/kg/h for 6 hours  Options provided:  -- Acute kidney injury  -- Other - I will add my own diagnosis  -- Disagree - Not applicable / Not valid  -- Disagree - Clinically unable to determine / Unknown  -- Refer to Clinical Documentation Reviewer    PROVIDER RESPONSE TEXT:    This patient has an Acute kidney injury.     Query created by: David Boyle on 2023 4:56 PM      Electronically signed by:  Darcy Rodriguez MD 2023 8:40 PM

## 2023-11-22 NOTE — PROGRESS NOTES
Speech-Language Pathology    Received evaluation order. Reviewed chart, spoke with RN. Pt currently unavailable, having ultrasound completed. Will try back later, schedule permitting.     Courtney Dean [facility], [id number]  Ph. # [phone]

## 2023-11-22 NOTE — PROGRESS NOTES
Patient is oriented to self only. Very hard of hearing and only speaks Mandarin. VSS, on 2 L O2 nasal cannula. Incision to L hip covered with silver dressing, CDI. Endorsing pain to L hip only with movement. Weak L dorsi/plantar flexion. +2 L pedal pulse with brisk cap refil. Tolerating diet well, denies n/v. Voiding without complications. Ambulating x1 assist with gait belt and walker. Fall precautions in place. Bed locked in lowest position with alarm on. Call light within reach. Hourly rounding in anticipation of patient needs. Plan of care continues.

## 2023-11-22 NOTE — PROGRESS NOTES
awake, alert, pleasant; memory issues at baseline  Communication Observation: verbal, Mandarin; unfortunately does not do well with video  d/t significant hearing loss  Follows Directions: simple  Dentition/Oral Mucosa: natural dentition; couple missing teeth  Oral motor exam: WFL  Vocal quality: WFL, clear, strong, dry  Respiratory Status/oxygen requirements: room air  Vision/Hearing: very hard of hearing  Patient Complaint: did not state  Patient Positioning: upright in bed  PLOF: from nursing facility; memory issues at baseline    Prior Dysphagia History:   None found in EMR. Reportedly consumes soft food at baseline. Son states she normally coughs in the morning when she first wakes up; was coughing more this morning. Unknown if she has reflux. Bedside Swallowing Evaluation Impression 11/22/2023  Mild oral dysphagia characterized by prolonged mastication of solids. For PO trials assessed (thins via cup/straw, puree, soft solid), pt with appropriate oral acceptance, clearance, no overt s/s aspiration. Discussed possibility of possible MBS to further assess swallow function, however this was declined. Recommend downgrade to soft & bite-sized (pt normally on soft diet), continue thin liquids, strict upright positioning, and frequent oral hygiene. Will continue to follow. Education  Purpose of visit, swallow function, recommendations. Pt with questionable comprehension; family stated good understanding. Prognosis:  Prognosis for improvement: fair  Barriers to reach goals: prior level of function  Consulted and agree with results and recommendations: patient/family, RN    Treatment:  Dysphagia Goals: The pt will be seen  1-2 x to address the following goals:  Goal 1: Patient will tolerate least restrictive diet with adequate oral prep and without overt signs of aspiration or associated decline in respiratory status.   Goal 2: Patient/caregiver will demonstrate understanding of swallowing concerns/recommendations. 11/22: provided education to patient/family re: swallow function, aspiration signs/concerns, instrumental assessment, diet options, positioning recommendations, and importance of oral hygiene. Pt with questionable comprehension; son stated good understanding. Pt goal: did not state  Pt discharge goal: did not state    Total treatment time: 15 minutes    Plan:  Continue per POC  Recommended diet: soft & bite-sized / thin liquids / meds PO  - upright position  - small bites / sips  - slow rate  *oral hygiene 2 x daily*    Discharge Recommendation: TBD  Discussed with RNJennifer  Needs met prior to leaving room, call light within reach    [facility], [id number]  Pg. # [phone]    This document will serve as a discharge summary if patient discharges prior to next visit.

## 2023-11-22 NOTE — PROGRESS NOTES
11/22/23 1431   Encounter Summary   Encounter Overview/Reason  Initial Encounter   Service Provided For: Patient and family together   Referral/Consult From: [address] [place] [address]   Last Encounter  11/22/23   Complexity of Encounter Moderate   Begin Time 1429   End Time  1431   Total Time Calculated 2 min   Assessment/Intervention/Outcome   Assessment Calm;Coping   Outcome Refused/Declined      Intern ANA Broussard [facility]

## 2023-11-22 NOTE — PROGRESS NOTES
Physical Therapy  Facility/Department: [facility] 5T ORTHO/NEURO  Daily Treatment Note  NAME: Maria G Cohen  : 3/24/1932  MRN: [id number]    Date of Service: 2023    Discharge Recommendations:     Maria G Cohen scored a 14/24 on the AM-PAC short mobility form. Current research shows that an AM-PAC score of 17 or less is typically not associated with a discharge to the patient's home setting. Based on the patient's AM-PAC score and their current functional mobility deficits, it is recommended that the patient have 3-5 sessions per week of Physical Therapy at d/c to increase the patient's independence. Please see assessment section for further patient specific details. Patient Diagnosis(es): The primary encounter diagnosis was Fall, initial encounter. A diagnosis of Hip fracture requiring operative repair, left, closed, initial encounter [facility]) was also pertinent to this visit. Assessment   Pt was limited by pain and generalized weakness. Presented with poor trunk and LE control during bed mobility. When moving from a flat surface and without handrails pt requires assistance and sequencing cues to sit EOB safely. Transfers and gait training were mildly unsteady requiring navigational cues throughout; no LOB noted. Plan    Physical Therapy Plan  General Plan: 5-7 times per week  Current Treatment Recommendations: Strengthening;Balance training;Gait training;Functional mobility training;Transfer training;Patient/Caregiver education & training;Home exercise program;Therapeutic activities; Modalities     Restrictions  Position Activity Restriction  Other position/activity restrictions: WBAT LLE - loose ant precautions     Subjective    Subjective  Subjective: Pt was in bed willing to participate; pt requires son present to assist with language barrier. Video  doesn't work due to [facility] Pain: grimace during transfers.   Orientation  Overall Orientation Status:  (unable to assess to language

## 2023-11-22 NOTE — PROGRESS NOTES
V2.0    [facility] Progress Note      Name:  Angela Garcia /Age/Sex: 3/24/1932  (80 y.o. female)   MRN & CSN:  [id number] & [id number] Encounter Date/Time: 2023 11:05 AM EST   Location:  5505/5505-01 PCP: Scott Irby MD     Attending:Lisy Vee MD       Hospital Day: 5    Assessment and Recommendations   Angela Garcia is a 80 y.o. female with pmh of atrial fibrillation on anticoagulation who presented with Displaced intertrochanteric fracture of left femur, initial encounter for closed fracture [facility])    Patient is a 51-year-old nursing home resident who presented to the emergency room with complaint of left hip pain after unwitnessed fall. X-rays demonstrated a displaced subcapital left femoral fracture. Plan:   Left hip fracture after fall-orthopedics consulted. S/p left hip hemiarthroplasty . Postsurgical x-ray with satisfactory alignment of the total left hip prosthesis. Continue on Lovenox for 30 days. Weightbearing as tolerated PT for therapy she was evaluated this morning and did poorly; pain controlled DC fluids  Hypertension-continue home losartan  H-egv-anyepoig Toprol, restart eliquis post-op; okay with Ortho to switch      Diet ADULT DIET; Regular; 4 carb choices (60 gm/meal)  ADULT ORAL NUTRITION SUPPLEMENT; Breakfast, Lunch, Dinner; Standard High Calorie/High Protein Oral Supplement   DVT Prophylaxis [] Lovenox, []  Heparin, [] SCDs, [] Ambulation,  [x] Eliquis, [] Xarelto  [] Coumadin   Code Status Full Code   Disposition From: Long term care  Expected Disposition: Skilled nursing facility  Estimated Date of Discharge: tomorrow  Patient requires continued admission due to need for surgical intervention   Surrogate Decision Maker/ POA  Per EMR     Personally reviewed Lab Studies and Imaging   2023  No labs noted today  EKG interpreted personally and results demonstrated A-fib with no ischemic changes.     Imaging that was interpreted personally includes x-rays of the

## 2023-11-22 NOTE — PROGRESS NOTES
position/activity restrictions: WBAT LLE - loose ant precautions    Subjective   Subjective  Subjective: Pt met supine in bed. Son in room and very helpful.  not effective due to [facility] Pain increasing with movement but not rated. Orientation  Overall Orientation Status:  (unable to assess to language barrier)  Orientation Level: Oriented to person  Pain: grimace during transfers. Cognition  Arousal/Alertness: Appropriate responses to stimuli  Following Commands: Follows one step commands with repetition  Insights: Decreased awareness of deficits  Initiation: Requires cues for some  Sequencing: Requires cues for some  Cognition Comment: Pt is very helpful throughout session. Objective    Pt with loose tooth noted during oral care with RN aware     Bed Mobility Training  Bed Mobility Training: Yes  Supine to Sit: Maximum assistance  Scooting: Minimum assistance  Balance  Sitting: Impaired  Sitting - Static: Poor (constant support)  Sitting - Dynamic: Good (unsupported)  Standing: Impaired  Standing - Static: Good (With)  Standing - Dynamic: Fair  Gait  Overall Level of Assistance: Contact-guard assistance;Minimum assistance (Pt completing functional mobility to and from bathroom with asist for RW managment and CG to MIn A)  Assistive Device: Walker, rolling     ADL  Grooming: Verbal cueing;Stand by assistance  Grooming Skilled Clinical Factors: oral care and washing hands and face seated in recliner chair  Toileting: Moderate assistance  Toileting Skilled Clinical Factors: Pt requiring assit for briefs and chasity care. Safety Devices  Type of Devices: Call light within reach; Chair alarm in place;Nurse notified; Left in chair;Gait belt     Patient Education  Education Given To: Patient  Education Provided: Role of Therapy;Plan of Care;ADL Adaptive Strategies;Transfer Training  Education Method: Demonstration;Verbal  Barriers to Learning: Cognition (launguage barrier with son present and

## 2023-11-23 LAB
BLOOD BANK DISPENSE STATUS: NORMAL
BLOOD BANK DISPENSE STATUS: NORMAL
BLOOD BANK PRODUCT CODE: NORMAL
BLOOD BANK PRODUCT CODE: NORMAL
BPU ID: NORMAL
BPU ID: NORMAL
DESCRIPTION BLOOD BANK: NORMAL
DESCRIPTION BLOOD BANK: NORMAL

## 2023-11-23 PROCEDURE — 2580000003 HC RX 258: Performed by: ORTHOPAEDIC SURGERY

## 2023-11-23 PROCEDURE — 1200000000 HC SEMI PRIVATE

## 2023-11-23 PROCEDURE — 6360000002 HC RX W HCPCS: Performed by: ORTHOPAEDIC SURGERY

## 2023-11-23 PROCEDURE — 6370000000 HC RX 637 (ALT 250 FOR IP): Performed by: NURSE PRACTITIONER

## 2023-11-23 PROCEDURE — 6370000000 HC RX 637 (ALT 250 FOR IP): Performed by: ORTHOPAEDIC SURGERY

## 2023-11-23 PROCEDURE — 97535 SELF CARE MNGMENT TRAINING: CPT

## 2023-11-23 RX ORDER — POLYETHYLENE GLYCOL 3350 17 G/17G
17 POWDER, FOR SOLUTION ORAL DAILY PRN
Qty: 30 PACKET | Refills: 0 | Status: SHIPPED | OUTPATIENT
Start: 2023-11-23 | End: 2023-12-23

## 2023-11-23 RX ORDER — SENNA AND DOCUSATE SODIUM 50; 8.6 MG/1; MG/1
1 TABLET, FILM COATED ORAL 2 TIMES DAILY
COMMUNITY
Start: 2023-11-23

## 2023-11-23 RX ORDER — TAMSULOSIN HYDROCHLORIDE 0.4 MG/1
0.4 CAPSULE ORAL DAILY
Qty: 30 CAPSULE | Refills: 3 | Status: SHIPPED | OUTPATIENT
Start: 2023-11-24

## 2023-11-23 RX ORDER — HEPARIN SODIUM 5000 [USP'U]/ML
5000 INJECTION, SOLUTION INTRAVENOUS; SUBCUTANEOUS 2 TIMES DAILY
Status: DISCONTINUED | OUTPATIENT
Start: 2023-11-24 | End: 2023-11-25 | Stop reason: HOSPADM

## 2023-11-23 RX ADMIN — POLYETHYLENE GLYCOL 3350 17 G: 17 POWDER, FOR SOLUTION ORAL at 09:06

## 2023-11-23 RX ADMIN — LOSARTAN POTASSIUM 25 MG: 25 TABLET, FILM COATED ORAL at 08:56

## 2023-11-23 RX ADMIN — TAMSULOSIN HYDROCHLORIDE 0.4 MG: 0.4 CAPSULE ORAL at 08:56

## 2023-11-23 RX ADMIN — LOSARTAN POTASSIUM 25 MG: 25 TABLET, FILM COATED ORAL at 20:28

## 2023-11-23 RX ADMIN — SODIUM CHLORIDE, PRESERVATIVE FREE 10 ML: 5 INJECTION INTRAVENOUS at 20:27

## 2023-11-23 RX ADMIN — ENOXAPARIN SODIUM 30 MG: 100 INJECTION SUBCUTANEOUS at 08:56

## 2023-11-23 RX ADMIN — SODIUM CHLORIDE, PRESERVATIVE FREE 10 ML: 5 INJECTION INTRAVENOUS at 09:01

## 2023-11-23 RX ADMIN — METOPROLOL SUCCINATE 25 MG: 25 TABLET, EXTENDED RELEASE ORAL at 08:56

## 2023-11-23 RX ADMIN — MIRTAZAPINE 7.5 MG: 15 TABLET, FILM COATED ORAL at 20:28

## 2023-11-23 RX ADMIN — DOCUSATE SODIUM 50 MG AND SENNOSIDES 8.6 MG 1 TABLET: 8.6; 5 TABLET, FILM COATED ORAL at 20:28

## 2023-11-23 ASSESSMENT — PAIN SCALES - WONG BAKER: WONGBAKER_NUMERICALRESPONSE: 0

## 2023-11-23 ASSESSMENT — PAIN SCALES - GENERAL: PAINLEVEL_OUTOF10: 0

## 2023-11-23 NOTE — PROGRESS NOTES
Patient Is alert and oriented to self. VSS . Patient is hard of hearing and has a speech barrier due to only speaking Mandarin. Incision to left hip CDI. Voiding without complications. Ambulates x1 gait belt/walker. Standard safety precautions in place. Plan of care ongoing.

## 2023-11-23 NOTE — CARE COORDINATION
Addendum 1424    CM spoke with Eduin Butt Ms Mann's son. He would like to appeal the discharge. Boris Bingham information provided. Case Management Assessment            Discharge Note                    Date / Time of Note: 11/23/2023 2:02 PM                  Discharge Note Completed by: Aldo Perez RN    Ms. Savannah Guerra will discharge back to her long-term care facility [facility]. RN CALL REPORT [phone]    Patient Name: Afia Rivera   YOB: 1932  Diagnosis: Fall, initial encounter [W19. XXXA]  Displaced intertrochanteric fracture of left femur, initial encounter for closed fracture [facility]) Koby Lea   Date / Time: 11/18/2023  7:40 PM    Current PCP: Soumya Santiago MD    Advance Directives:  Code Status: Full Code    Financial:  Payor: Luis Moment I-SNP / Plan: Luis Moment I-SNP / Product Type: *No Product type* /      Pharmacy:    [address] [facility] #[id number] Nina Chinchillaes, [facility] [address] [place] [address] F [phone]  [address] [place] [id number]  Phone: [phone] Fax: [phone]    ADLS:  Current PT AM-PAC Score: 14 /24  Current OT AM-PAC Score: 15 /24    DISCHARGE Disposition:   [facility]  Address: [address] [place] [address]  Phone: [phone]    LOC at discharge: [address]  ALVARADO Completed: Yes    IMM Completed:   Yes, Case management has presented and reviewed IMM letter #2 to the patient and/or family/ POA. Patient and/or family/POA verbalized understanding of their medicare rights and appeal process if needed. Patient and/or family/POA has signed and placed today's date (11/21/2023) and time ([id number]) on letter #2 on the the appropriate lines. Patient and/or family/POA, provided copy of signed letter and they are aware that this original copy of IMM letter #2 is available prior to discharge from the paper chart on the unit.   Electronic documentation has been entered into epic for IMM letter #2 and original paper copy has been added to the paper chart at the nurses station. IMM Letter given to Patient/Family/Significant other/Guardian/POA/by[de-identified] Guero Bernal  IMM Letter date given[de-identified] 11/21/23  IMM Letter time given[de-identified] 5877    Transportation:  Transportation PLAN for discharge: EMS transportation   Mode of Transport: Ambulance stretcher - BLS  Name of [address] [place] [address] Road: [facility]: [phone]  Time of Transport: 330-4p    Transport form completed: Yes    COVID Result:  No results found for: \"COVID19\"    The Plan for Transition of Care is related to the following treatment goals of Fall, initial encounter [W19. XXXA]  Displaced intertrochanteric fracture of left femur, initial encounter for closed fracture [facility]) Shon Arceo    The Patient and/or patient representative Jasper Sloan and her family were provided with a choice of provider and agrees with the discharge plan Yes    Freedom of choice list was provided with basic dialogue that supports the patient's individualized plan of care/goals and shares the quality data associated with the providers.  Yes    Care Transitions patient: No    Susy Hernandez RN  [facility]  Case Management Department  [phone]

## 2023-11-23 NOTE — PLAN OF CARE
Problem: Discharge Planning  Goal: Discharge to home or other facility with appropriate resources  Outcome: Progressing  Flowsheets (Taken 11/23/2023 0133)  Discharge to home or other facility with appropriate resources:   Identify barriers to discharge with patient and caregiver   Arrange for needed discharge resources and transportation as appropriate     Problem: Pain  Goal: Verbalizes/displays adequate comfort level or baseline comfort level  11/23/2023 0133 by Galdino Tim RN  Outcome: Progressing  Flowsheets (Taken 11/23/2023 0133)  Verbalizes/displays adequate comfort level or baseline comfort level:   Encourage patient to monitor pain and request assistance   Assess pain using appropriate pain scale     Problem: Safety - Adult  Goal: Free from fall injury  11/23/2023 0133 by Galdino Tim RN  Outcome: Progressing  Flowsheets (Taken 11/23/2023 0133)  Free From Fall Injury:   Based on caregiver fall risk screen, instruct family/caregiver to ask for assistance with transferring infant if caregiver noted to have fall risk factors   Instruct family/caregiver on patient safety     Problem: Confusion  Goal: Confusion, delirium, dementia, or psychosis is improved or at baseline  Description: INTERVENTIONS:  1. Assess for possible contributors to thought disturbance, including medications, impaired vision or hearing, underlying metabolic abnormalities, dehydration, psychiatric diagnoses, and notify attending LIP  2. [place] high risk fall precautions, as indicated  3. Provide frequent short contacts to provide reality reorientation, refocusing and direction  4. Decrease environmental stimuli, including noise as appropriate  5. Monitor and intervene to maintain adequate nutrition, hydration, elimination, sleep and activity  6. If unable to ensure safety without constant attention obtain sitter and review sitter guidelines with assigned personnel  7.  Initiate Psychosocial CNS and Spiritual Care Yes

## 2023-11-23 NOTE — PLAN OF CARE
Problem: Discharge Planning  Goal: Discharge to home or other facility with appropriate resources  11/23/2023 0739 by Caren Rea RN  Outcome: Progressing     Problem: Pain  Goal: Verbalizes/displays adequate comfort level or baseline comfort level  11/23/2023 0739 by Caren Rea RN  Outcome: Progressing

## 2023-11-23 NOTE — DISCHARGE SUMMARY
V2.0  Discharge Summary    Name:  Angela Garcia /Age/Sex: 3/24/1932 (80 y.o. female)   Admit Date: 2023  Discharge Date: 23    MRN & CSN:  [id number] & [id number] Encounter Date and Time 23 1:48 PM EST    Attending:  Jn Urias MD Discharging Provider: Rachel Mullins, APRN - CNP       Hospital Course:     Brief HPI: Angela Garcia is a 80 y.o. female who presented with  pmh of atrial fibrillation on anticoagulation who presented with Displaced intertrochanteric fracture of left femur, initial encounter for closed fracture ([address])     Brief Problem Based Course:   Left hip fracture after fall-orthopedics consulted. S/p left hip hemiarthroplasty . Postsurgical x-ray with satisfactory alignment of the total left hip prosthesis. Continue on eliquis home medication;  Weightbearing as tolerated PT for therapy she was evaluated this morning and did poorly; pain controlled DC fluids; Hypertension-continue home losartan  F-kig-exxoxaly Toprol, restart eliquis post-op; okay with Ortho to switch      The patient expressed appropriate understanding of, and agreement with the discharge recommendations, medications, and plan.      Consults this admission:  IP CONSULT TO ORTHOPEDIC SURGERY    Discharge Diagnosis:   Displaced intertrochanteric fracture of left femur, initial encounter for closed fracture [facility])        Discharge Instruction:   Follow up appointments: Orthopedic surgery Dr. Clifton Albright  Primary care physician: Scott Irby MD within 2 weeks  Diet: regular diet   Activity: activity as tolerated  Disposition: Discharged to:   []Home, [][facility], [x]SNF, []Acute Rehab, []Hospice   Condition on discharge: Stable  Labs and Tests to be Followed up as an outpatient by PCP or Specialist:     Discharge Medications:        Medication List        START taking these medications      oxybutynin 5 MG extended release tablet  Commonly known as: DITROPAN-XL     polyethylene glycol 17 g packet  Commonly 11/19/2023 06:47 AM    RBCUA 3-4 11/19/2023 06:47 AM    BACTERIA 4+ 11/19/2023 06:47 AM    CLARITYU Clear 11/19/2023 06:47 AM    SPECGRAV 1.015 11/19/2023 06:47 AM    LEUKOCYTESUR TRACE 11/19/2023 06:47 AM    UROBILINOGEN 0.2 11/19/2023 06:47 AM    BILIRUBINUR Negative 11/19/2023 06:47 AM    BLOODU TRACE-INTACT 11/19/2023 06:47 AM    GLUCOSEU Negative 11/19/2023 06:47 AM    KETUA Negative 11/19/2023 06:47 AM     Urine Cultures: No results found for: \"LABURIN\"  Blood Cultures: No results found for: \"BC\"  No results found for: \"BLOODCULT2\"  Organism: No results found for: \"ORG\"    Time Spent Discharging patient > 31  minutes    Electronically signed by ROSEANN Lang CNP on 11/23/2023 at 1:48 PM

## 2023-11-23 NOTE — PROGRESS NOTES
Occupational Therapy  Facility/Department: [facility] 5T ORTHO/NEURO  Daily Treatment Note  NAME: Ryland Hashimoto  : 3/24/1932  MRN: [id number]    Date of Service: 2023    Discharge Recommendations: Ryland Hashimoto scored a 15/24 on the AM-PAC ADL Inpatient form. Current research shows that an AM-PAC score of 17 or less is typically not associated with a discharge to the patient's home setting. Based on the patient's AM-PAC score and their current ADL deficits, it is recommended that the patient have 5-7 sessions per week of Occupational Therapy at d/c to increase the patient's independence. At this time, this patient demonstrates complex nursing, medical, and rehabilitative needs, and would benefit from intensive rehabilitation services upon discharge from the Inpatient setting. This patient demonstrates the ability to participate in and benefit from an intensive therapy program with a coordinated interdisciplinary team approach to foster frequent, structured, and documented communication among disciplines, who will work together to establish, prioritize, and achieve treatment goals. Please see assessment section for further patient specific details. If patient discharges prior to next session this note will serve as a discharge summary. Please see below for the latest assessment towards goals. OT Equipment Recommendations  Other: defer to next care facility      Patient Diagnosis(es): The primary encounter diagnosis was Fall, initial encounter. A diagnosis of Hip fracture requiring operative repair, left, closed, initial encounter [facility]) was also pertinent to this visit. Assessment    Assessment: Pt currently requires significant assistance for all ADLs/functional mobility performed during this session.  Pt would benefit from further therapy to maximize therapeutic gains and functional independence    Activity Tolerance: Patient tolerated treatment well  Other: defer to next care facility bed>BR>chair with 2WW and Mod A for balance and 2WW navigation)     ADL  Toileting: Dependent/Total  Toileting Skilled Clinical Factors: total A to cleanse post urination. pt attempts however, unable to complete        Safety Devices  Type of Devices: Call light within reach; Chair alarm in place;Nurse notified; Left in chair;Gait belt     Patient Education  Education Given To: Patient; Family  Education Provided: Role of Therapy;Plan of Care;ADL Adaptive Strategies;Transfer Training; Fall Prevention Strategies  Education Method: Verbal  Barriers to Learning: Cognition  Education Outcome: Continued education needed    Goals  Short Term Goals  Time Frame for Short Term Goals: at dc  Short Term Goal 1: Stance x 5 mins with CGA for ADL/ IADLs  Short Term Goal 2: LE dressing with Min A and AE prn  Short Term Goal 3: Toileting with CGA  Short Term Goal 4: Grooming with setup  Short Term Goal 5: Functional transfers with CGA  Patient Goals   Patient goals : unable to state -- Son hopeful for return to baseline level       Therapy Time   Individual Concurrent Group Co-treatment   Time In 0926         Time Out 0950         Minutes [address] [place], [place]

## 2023-11-24 PROCEDURE — 97530 THERAPEUTIC ACTIVITIES: CPT

## 2023-11-24 PROCEDURE — 6370000000 HC RX 637 (ALT 250 FOR IP): Performed by: ORTHOPAEDIC SURGERY

## 2023-11-24 PROCEDURE — 97116 GAIT TRAINING THERAPY: CPT

## 2023-11-24 PROCEDURE — 2580000003 HC RX 258: Performed by: ORTHOPAEDIC SURGERY

## 2023-11-24 PROCEDURE — 97110 THERAPEUTIC EXERCISES: CPT

## 2023-11-24 PROCEDURE — 6360000002 HC RX W HCPCS: Performed by: ORTHOPAEDIC SURGERY

## 2023-11-24 PROCEDURE — 97535 SELF CARE MNGMENT TRAINING: CPT

## 2023-11-24 PROCEDURE — 6370000000 HC RX 637 (ALT 250 FOR IP): Performed by: NURSE PRACTITIONER

## 2023-11-24 PROCEDURE — 1200000000 HC SEMI PRIVATE

## 2023-11-24 RX ADMIN — MIRTAZAPINE 7.5 MG: 15 TABLET, FILM COATED ORAL at 20:38

## 2023-11-24 RX ADMIN — POLYETHYLENE GLYCOL 3350 17 G: 17 POWDER, FOR SOLUTION ORAL at 09:11

## 2023-11-24 RX ADMIN — SODIUM CHLORIDE, PRESERVATIVE FREE 10 ML: 5 INJECTION INTRAVENOUS at 20:45

## 2023-11-24 RX ADMIN — METOPROLOL SUCCINATE 25 MG: 25 TABLET, EXTENDED RELEASE ORAL at 09:09

## 2023-11-24 RX ADMIN — HEPARIN SODIUM 5000 UNITS: 5000 INJECTION INTRAVENOUS; SUBCUTANEOUS at 20:38

## 2023-11-24 RX ADMIN — LOSARTAN POTASSIUM 25 MG: 25 TABLET, FILM COATED ORAL at 20:39

## 2023-11-24 RX ADMIN — DOCUSATE SODIUM 50 MG AND SENNOSIDES 8.6 MG 1 TABLET: 8.6; 5 TABLET, FILM COATED ORAL at 20:39

## 2023-11-24 RX ADMIN — SODIUM CHLORIDE, PRESERVATIVE FREE 10 ML: 5 INJECTION INTRAVENOUS at 09:10

## 2023-11-24 RX ADMIN — TAMSULOSIN HYDROCHLORIDE 0.4 MG: 0.4 CAPSULE ORAL at 09:09

## 2023-11-24 RX ADMIN — HEPARIN SODIUM 5000 UNITS: 5000 INJECTION INTRAVENOUS; SUBCUTANEOUS at 09:09

## 2023-11-24 RX ADMIN — LOSARTAN POTASSIUM 25 MG: 25 TABLET, FILM COATED ORAL at 09:09

## 2023-11-24 NOTE — CARE COORDINATION
Case Management   Discharge Appeal Note    8:41 AM    Patient has chosen to appeal discharge, IMM signed, Detailed Notice of Discharge delivered to patient and reviewed with patient/patient's son.   uploaded all documents electronically to the [address] [place] [address].    IMM Letter  IMM Letter given to Patient/Family/Significant other/Guardian/POA/by[de-identified] Ilana Louise  IMM Letter date given[de-identified] 11/21/23  IMM Letter time given[de-identified] [address] [facility] Appeal Case #: [id number]    Documentation for Discharge Appeal  Discharge Appealed by: Family  Date notifed by [facility]IO of appeal request[de-identified] 11/23/23  Time notified by Ross Fountain of appeal request[de-identified] [phone]  Detailed Notice of Discharge given to[de-identified] Family  Date Notice of Discharge given[de-identified] 11/24/23  Time Notice of Discharge given[de-identified] 4536  Date records sent to QIO: 11/24/23  Time records sent to O: [address] [place], [address] [facility]  Case Management Department  Ph: [phone]

## 2023-11-24 NOTE — PROGRESS NOTES
Pt alert to self. Level or orientation difficulty to assess as pt is [place]. VSS on RA. No acute changes this shift. Silver dressing to L hip CDI. No complaints of pain thus far. Voiding adequately via BRP. Ambulation x1 walker GB. Tolerating PO diet. Denies needs at this time. Fall precautions in place, call light within reach.

## 2023-11-24 NOTE — PROGRESS NOTES
Patient is oriented to self. Difficult to assess orientation/cognition d/t language barrier and patient being [place]. VSS, on room air. Denies having any pain. Silver dressing in place to L hip incision, CDI. Weak bilateral dorsi/plantar flexion, +1 L pedal pulse with brisk cap refill. Tolerating diet well, denies n/v. Voiding without complications via BRP. Ambulating x1 assist with walker and gait belt. Fall precautions in place. Bed locked in lowest position with alarm on. Call light within reach and patient using appropriately. Plan of care continues.

## 2023-11-24 NOTE — PROGRESS NOTES
Physical Therapy  Facility/Department: [address] [place] [address]   Physical Therapy Treatment    Name: Afia Rivera  : 3/24/1932  MRN: [id number]  Date of Service: 2023    Discharge Recommendations: Afia Rivera scored a  on the AM-PAC short mobility form. Current research shows that an AM-PAC score of 17 or less is typically not associated with a discharge to the patient's home setting. Based on the patient's AM-PAC score and their current functional mobility deficits, it is recommended that the patient have 3-5 sessions per week of Physical Therapy at d/c to increase the patient's independence. Please see assessment section for further patient specific details. If patient discharges prior to next session this note will serve as a discharge summary. Please see below for the latest assessment towards goals. Subacute/Skilled Nursing Facility   PT Equipment Recommendations  Other: defer to next level of care      Patient Diagnosis(es): The primary encounter diagnosis was Fall, initial encounter. A diagnosis of Hip fracture requiring operative repair, left, closed, initial encounter [facility]) was also pertinent to this visit. Past Medical History:  has a past medical history of Atrial fibrillation ([address]) and Hypertension. Past Surgical History:  has a past surgical history that includes Hysterectomy and hip surgery (Left, 2023). Assessment   Body Structures, Functions, Activity Limitations Requiring Skilled Therapeutic Intervention: Decreased functional mobility   Assessment: Pt requires mod to max A x 1 for safe mobility today. Increased time and cues to complete tasks. Pt perseverates at times. Mobility is limited by generalized weakness and pain. Pt is below her functional baseline. Rec SNF at d/c.   Treatment Diagnosis: impaired gait and transfers  Therapy Prognosis: Good  Requires PT Follow-Up: Yes     Plan   Physical Therapy Plan  General Plan: 5-7 times per week  Current Treatment (responds to \"Alysa\", orientation assessmen limited due to language barrier)  Cognition  Cognition Comment: pt perseverates on tasks     Objective      Observation/Palpation  Observation: one tooth located on the lower frontal/right side of mouth appears very loose. RN called to room to address - RN to notify medical team.  Prior to therapist noticing loose tooth, pt was observed to pocket her drink during session and spit out after ~6-8 min. RN notified. Bed mobility  Supine to Sit: Maximum assistance (HOB elevated)  Scooting: Maximal assistance  Transfers  Sit to Stand: Maximum Assistance (from toilet; min A from EOB)  Stand to Sit: Moderate Assistance  Comment: max cues for safety with transfers  Ambulation  Device: Rolling Walker  Assistance: Minimal assistance  Quality of Gait: slow, small steps, effortful, flexed trunk, unsteady, needs assist to turn walker  Distance: 10 ft; 20 ft     Balance  Sitting - Static: Fair  Sitting - Dynamic: Fair  Standing - Static: Poor (pt requires mod A to maintain balance while standing at toilet and performing pericare; pt demos posterior lean)  Standing - Dynamic: Poor (min A x 1 with RW)  Exercise Treatment: Pt performed APs bilaterally after max cues and initial assist for L LE.  pt demos LAQ through full ROM x 10 trials bilaterally; demos numerous/continuous L LAQ through partial ROM while seated in chair  BP sitting EOB, prior to amb: 131/75.         OutComes Score                                                  AM-PAC Score  AM-PAC Inpatient Mobility Raw Score : 12 (11/24/23 1203)  AM-PAC Inpatient T-Scale Score : 35.33 (11/24/23 1203)  Mobility Inpatient CMS 0-100% Score: 68.66 (11/24/23 1203)  Mobility Inpatient CMS G-Code Modifier : CL (11/24/23 1203)          Tinneti Score       Goals  Short Term Goals  Time Frame for Short Term Goals: discharge  Short Term Goal 1: Pt will transfer supine <--> sit with min A x 1  ongoing  Short Term Goal 2: Pt

## 2023-11-24 NOTE — CARE COORDINATION
Case Management Assessment            Discharge Note                    Date / Time of Note: 11/24/2023 3:06 PM                  Discharge Note Completed by: Missy Olivares RN    Patient Name: Maria G Cohen   YOB: 1932  Diagnosis: Fall, initial encounter [W19. XXXA]  Displaced intertrochanteric fracture of left femur, initial encounter for closed fracture [facility]) Adriana Lawrence   Date / Time: 11/18/2023  7:40 PM    Current PCP: Lazaro Rios MD  Clinic patient: No    Hospitalization in the last 30 days: No       Advance Directives:  Code Status: Full Code  [place] DNR form completed and on chart: Not Indicated    Financial:  Payor: Geoff PUGA / Plan: Geoff Pitt I-SNP / Product Type: *No Product type* /      Pharmacy:    0 [address], [address] [place] [address] [phone] [address] [facility] [address]  Phone: [phone] Fax: [phone]      Assistance purchasing medications?:    Assistance provided by Case Management: None at this time    Does patient want to participate in local refill/ meds to beds program?: No    Meds To Beds General Rules:  1. Can ONLY be done Monday- Friday between 8:30am-5pm  2. Prescription(s) must be in pharmacy by 3pm to be filled same day  3. Copy of patient's insurance/ prescription drug card and patient face sheet must be sent along with the prescription(s)  4. Cost of Rx cannot be added to hospital bill. If financial assistance is needed, please contact unit  or ;  or  CANNOT provide pharmacy voucher for patients co-pays  5.  Patients can then  the prescription on their way out of the hospital at discharge, or pharmacy can deliver to the bedside if staff is available. (payment due at time of pick-up or delivery - cash, check, or card accepted)     Able to afford home medications/ co-pay costs:

## 2023-11-24 NOTE — PROGRESS NOTES
Occupational Therapy  Facility/Department: [address] [place] [address]   Occupational Therapy Treatment    Name: Lavell Borden  : 3/24/1932  MRN: [id number]  Date of Service: 2023    Discharge Recommendations:   Lavell Borden scored a 15 on the AM-PAC ADL Inpatient form. Current research shows that an AM-PAC score of 17 or less is typically not associated with a discharge to the patient's home setting. Based on the patient's AM-PAC score and their current ADL deficits, it is recommended that the patient have 3-5 sessions per week of Occupational Therapy at d/c to increase the patient's independence. Please see assessment section for further patient specific details. If patient discharges prior to next session this note will serve as a discharge summary. Please see below for the latest assessment towards goals. OT Equipment Recommendations  Equipment Needed: No  Other: defer to [facility] care facility       Patient Diagnosis(es): The primary encounter diagnosis was Fall, initial encounter. A diagnosis of Hip fracture requiring operative repair, left, closed, initial encounter [facility]) was also pertinent to this visit. Past Medical History:  has a past medical history of Atrial fibrillation ([address]) and Hypertension. Past Surgical History:  has a past surgical history that includes Hysterectomy and hip surgery (Left, 2023). Treatment Diagnosis: impaired ADLs/ functional transfers 2/2 fall + L hip hemiarthroplasty      Assessment   Performance deficits / Impairments: Decreased functional mobility ; Decreased endurance;Decreased ADL status; Decreased safe awareness  Assessment: Pt cont to req up to max assist for mobility and ADL. Pt would benefit from ongoing inpt OT at d/c to maximize functional level Will follow as inpt.   Treatment Diagnosis: impaired ADLs/ functional transfers 2/2 fall + L hip hemiarthroplasty  REQUIRES OT FOLLOW-UP: Yes  Activity Tolerance  Activity Tolerance: Patient Tolerated

## 2023-11-24 NOTE — PLAN OF CARE
Problem: Safety - Adult  Goal: Free from fall injury  Outcome: Progressing  Note: Fall precautions in place. Bed alarm on, bed in lowest position, call light within reach, non slip socks, hourly rounding. Problem: Skin/Tissue Integrity  Goal: Absence of new skin breakdown  Description: 1. Monitor for areas of redness and/or skin breakdown  2. Assess vascular access sites hourly  3. Every 4-6 hours minimum:  Change oxygen saturation probe site  4. Every 4-6 hours:  If on nasal continuous positive airway pressure, respiratory therapy assess nares and determine need for appliance change or resting period. Outcome: Progressing  Note: Turning and pillow support utilized.

## 2023-11-24 NOTE — PROGRESS NOTES
Patient had a loose tooth, front lower middle of jaw. This RN asked NP Patrizia Goodman to come evaluate as tooth posed a choking hazard. When NP came to bedside, tooth had come out. No bleeding noted. Tooth wrapped in tissue paper and placed on counter.

## 2023-11-24 NOTE — PLAN OF CARE
Problem: Skin/Tissue Integrity  Goal: Absence of new skin breakdown  Description: 1. Monitor for areas of redness and/or skin breakdown  2. Assess vascular access sites hourly  3. Every 4-6 hours minimum:  Change oxygen saturation probe site  4. Every 4-6 hours:  If on nasal continuous positive airway pressure, respiratory therapy assess nares and determine need for appliance change or resting period. 11/24/2023 0959 by Ulises Tracy, RN  Outcome: Progressing  Note: On specialty mattress. Q2 turning and repositioning. Sacral heart in place to coccyx. Heels and elbows elevated off bed surface. Leslye care provided upon voiding. Plan of care continues. Problem: Safety - Adult  Goal: Free from fall injury  11/24/2023 0959 by Ulises Tracy RN  Outcome: Progressing  Note: Fall precautions in place. Non-skid socks on. Bed locked in lowest position with alarm on and side rails up. Bedside table, belongings, and call light within reach. Hourly rounding in anticipation of patient needs. Floor clean and free from clutter. Room door open. Plan of care continues.

## 2023-11-24 NOTE — PROGRESS NOTES
V2.0    [employer] [facility] Progress Note      Name:  Celine Greenfield /Age/Sex: 3/24/1932  (80 y.o. female)   MRN & CSN:  [id number] & [id number] Encounter Date/Time: 2023 11:05 AM EST   Location:  [facility] PCP: MD Melissa Ball MD       Hospital Day: 7    Assessment and Recommendations   Celine Greenfield is a 80 y.o. female with pmh of atrial fibrillation on anticoagulation who presented with Displaced intertrochanteric fracture of left femur, initial encounter for closed fracture [facility])    Patient is a 80-year-old nursing home resident who presented to the emergency room with complaint of left hip pain after unwitnessed fall. X-rays demonstrated a displaced subcapital left femoral fracture. Plan:   Left hip fracture after fall-orthopedics consulted. S/p left hip hemiarthroplasty . Postsurgical x-ray with satisfactory alignment of the total left hip prosthesis. Continue on Lovenox for 30 days. Weightbearing as tolerated PT for therapy she was evaluated this morning and did poorly; pain controlled DC fluids  Hypertension-continue home losartan  K-teh-jjpoqmvp Toprol, restart eliquis post-op; okay with Ortho to switch      Diet ADULT ORAL NUTRITION SUPPLEMENT; Breakfast, Lunch, Dinner; Standard High Calorie/High Protein Oral Supplement  ADULT DIET; Dysphagia - Soft and Bite Sized   DVT Prophylaxis [] Lovenox, []  Heparin, [] SCDs, [] Ambulation,  [x] Eliquis, [] Xarelto  [] Coumadin   Code Status Full Code   Disposition From: Long term care  Expected Disposition: Skilled nursing facility  Estimated Date of Discharge: tomorrow  Patient requires continued admission due to need for surgical intervention   Surrogate Decision Maker/ POA  Per EMR     Personally reviewed Lab Studies and Imaging   2023  No labs noted today  EKG interpreted personally and results demonstrated A-fib with no ischemic changes. I      Subjective:     Chief Complaint: Left hip surgical site noted. Up-to-date CT equipment and radiation dose reduction techniques were employed. FINDINGS: CT brain: No acute intracranial hemorrhage, mass effect, midline shift, or hydrocephalus. Gray-white matter differentiation is within normal limits. Mild patchy periventricular white matter hypodensities, likely chronic microvascular ischemic changes. Ventricles and sulci are prominent compatible with cerebral volume loss. Basal cisterns are clear. Visualized paranasal sinuses are clear. Visualized mastoid air cells are clear. Mild right scalp soft tissue swelling. Calvarium is intact. CT cervical spine: Normal cervical lordosis. Vertebral body height are preserved. Mild degenerative retrolisthesis of C3 on C4, C4 and C5, C5 on C6. 6  No acute fracture or traumatic subluxation. No prevertebral soft tissue swelling. Mild multilevel degenerative changes of the cervical spine including disc space height loss, endplate osteophytosis, and facet and uncovertebral hypertrophy. No significant stenosis of the bony spinal canal. Moderate bilateral neural foraminal narrowing at C4-5 and C5-6. No paraspinal soft tissue abnormality. 1.2 cm hypodense left thyroid nodule. Left apical pleural-parenchymal scarring. CT brain: No acute intracranial hemorrhage or mass effect. Mild right scalp soft tissue swelling. No calvarial fracture. CT cervical spine: No acute vertebral fracture or traumatic subluxation. Electronically signed by Zeenat Harrison MD      CBC:   No results for input(s): \"WBC\", \"HGB\", \"PLT\" in the last 72 hours. BMP:    No results for input(s): \"NA\", \"K\", \"CL\", \"CO2\", \"BUN\", \"CREATININE\", \"GLUCOSE\" in the last 72 hours. Hepatic: No results for input(s): \"AST\", \"ALT\", \"ALB\", \"BILITOT\", \"ALKPHOS\" in the last 72 hours.   Lipids: No results found for: \"CHOL\", \"HDL\", \"TRIG\"  Hemoglobin A1C: No results found for: \"LABA1C\"  TSH: No results found for: \"TSH\"  Troponin: No results found for: \"TROPONINT\"  Lactic Acid: No results for

## 2023-11-25 VITALS
HEIGHT: 64 IN | SYSTOLIC BLOOD PRESSURE: 105 MMHG | DIASTOLIC BLOOD PRESSURE: 50 MMHG | HEART RATE: 97 BPM | OXYGEN SATURATION: 97 % | TEMPERATURE: 98.9 F | BODY MASS INDEX: 19.12 KG/M2 | WEIGHT: 112 LBS | RESPIRATION RATE: 18 BRPM

## 2023-11-25 PROCEDURE — 2580000003 HC RX 258: Performed by: ORTHOPAEDIC SURGERY

## 2023-11-25 PROCEDURE — 6370000000 HC RX 637 (ALT 250 FOR IP): Performed by: ORTHOPAEDIC SURGERY

## 2023-11-25 PROCEDURE — 97535 SELF CARE MNGMENT TRAINING: CPT

## 2023-11-25 PROCEDURE — 6370000000 HC RX 637 (ALT 250 FOR IP): Performed by: NURSE PRACTITIONER

## 2023-11-25 RX ADMIN — SODIUM CHLORIDE, PRESERVATIVE FREE 10 ML: 5 INJECTION INTRAVENOUS at 09:31

## 2023-11-25 RX ADMIN — TAMSULOSIN HYDROCHLORIDE 0.4 MG: 0.4 CAPSULE ORAL at 09:28

## 2023-11-25 RX ADMIN — POLYETHYLENE GLYCOL 3350 17 G: 17 POWDER, FOR SOLUTION ORAL at 09:31

## 2023-11-25 RX ADMIN — LOSARTAN POTASSIUM 25 MG: 25 TABLET, FILM COATED ORAL at 09:28

## 2023-11-25 RX ADMIN — METOPROLOL SUCCINATE 25 MG: 25 TABLET, EXTENDED RELEASE ORAL at 09:28

## 2023-11-25 NOTE — PROGRESS NOTES
Pt oriented to self. Orientation level difficult to assess d/t pt being [facility] and language barrier. VSS on RA. No complaints of pain thus far. Ambulation x1 walker GB. Dressing to L hip CDI. Voiding adequately via purewick. Denies needs at this time. Fall precautions in place, call light within reach.

## 2023-11-25 NOTE — PROGRESS NOTES
Occupational Therapy  Occupational Therapy  Daily Treatment Note  Patient Name: Lexi Matias  MRN: [id number]    Chart Reviewed: Yes       Other position/activity restrictions: WBAT LLE - loose ant precautions     Additional Pertinent Hx: Admit 11/18 s/p fall; (+) L Left displaced subcapital femoral neck fracture; to OR 11/20 for L hip hemiarthroplasty; PMHx: a-fib, HTN      Diagnosis: LEFT Femoral Neck Fracture  s/p L hemiarthroplasty on 11/20  Treatment Diagnosis: impaired ADLs/ functional transfers 2/2 fall + L hip hemiarthroplasty    Subjective: Pt supine in bed with HOB raised. Pt's son present and pt agreeable to therapy session. Pt's son requesting pt get washed up. Pain: Pt states no pain but appeared with some pain during ambulation, not rated. Social/Functional History  Type of Home: Facility [facility]  Home Layout: One level  Bathroom Shower/Tub: Walk-in shower  Bathroom Toilet: Standard  Bathroom Equipment: Grab bars in shower, Shower chair, Grab bars around toilet  Home Equipment: Walker, rolling  Has the patient had two or more falls in the past year or any fall with injury in the past year?: Yes  ADL Assistance: Needs assistance (needing help with bathing son assisted 2/2 lanuage barrier)  Homemaking Responsibilities: No  Ambulation Assistance: Independent (with and without walker)  Transfer Assistance: Independent  Occupation: Retired  Leisure & Hobbies: IPAD / videos, exercises  Prior Function  ADL Assistance: Needs assistance (needing help with bathing son assisted 2/2 lanuage barrier)  Ambulation Assistance: Independent (with and without walker)  Transfer Assistance: Independent    Objective:    Cognition/Orientation: Orientation not assessed due to language barrier.  Delayed response to stimuli, follows one step commands with extended time and repetition, decreased awareness of need for safety/deficits, requires vcs for all initiation and Max A vcs for sequencing and vcs due to

## 2023-11-25 NOTE — PLAN OF CARE
Problem: Safety - Adult  Goal: Free from fall injury  11/24/2023 2053 by Ila Palomares, RN  Outcome: Progressing  Note: Fall precautions in place. Bed alarm on, bed in lowest position, call light within reach, non slip socks, hourly rounding. Problem: Skin/Tissue Integrity  Goal: Absence of new skin breakdown  Description: 1. Monitor for areas of redness and/or skin breakdown  2. Assess vascular access sites hourly  3. Every 4-6 hours minimum:  Change oxygen saturation probe site  4. Every 4-6 hours:  If on nasal continuous positive airway pressure, respiratory therapy assess nares and determine need for appliance change or resting period.   11/24/2023 2053 by Ila Palomares, RN  Outcome: Progressing

## 2023-11-25 NOTE — PLAN OF CARE
Problem: Skin/Tissue Integrity  Goal: Absence of new skin breakdown  Description: 1. Monitor for areas of redness and/or skin breakdown  2. Assess vascular access sites hourly  3. Every 4-6 hours minimum:  Change oxygen saturation probe site  4. Every 4-6 hours:  If on nasal continuous positive airway pressure, respiratory therapy assess nares and determine need for appliance change or resting period. 11/25/2023 0858 by Izabela Mejia, RN  Outcome: Progressing   On speciality mattress. Turns self, educated on turning self in bed. Problem: Safety - Adult  Goal: Free from fall injury  11/25/2023 0858 by Izabela Mejia, RN  Outcome: Progressing   All fall precautions in place. Bed locked and in lowest position with alarm on. Overbed table and personal belonings within reach. Call light within reach and patient instructed to use call light for assistance. Non-skid socks on.

## 2023-11-25 NOTE — PROGRESS NOTES
Report called to Ana Johnson at [facility] care. Updates given, Ana Johnson denies additional questions at time of report. Transport set for [employer] of care continues.

## 2023-11-25 NOTE — PROGRESS NOTES
Alert to self only. Assessment difficult to complete due to pt [facility] and difficulty with . VSS on room air. Denies pain. FLACC pain scale 0. Incision to left hip CDI. Able to ambulate x1 GB walker. Up in chair this shift. Voiding adequately via BRP. Tolerating PO diet and fluids with assistance. Son at bedside. Denies needs. Standard safety measures in place. Plan of care continues.

## 2023-11-25 NOTE — PROGRESS NOTES
Pt discharged without complication with transport. Son at bedside. Son to transport belongings. IV removed. Education complete. Pt denies needs at time of transport.

## 2023-12-02 ENCOUNTER — APPOINTMENT (OUTPATIENT)
Dept: CT IMAGING | Age: 88
End: 2023-12-02
Payer: MEDICARE

## 2023-12-02 ENCOUNTER — HOSPITAL ENCOUNTER (EMERGENCY)
Age: 88
Discharge: HOME | End: 2023-12-02
Attending: EMERGENCY MEDICINE
Payer: MEDICARE

## 2023-12-02 ENCOUNTER — APPOINTMENT (OUTPATIENT)
Dept: GENERAL RADIOLOGY | Age: 88
End: 2023-12-02
Payer: MEDICARE

## 2023-12-02 VITALS
DIASTOLIC BLOOD PRESSURE: 78 MMHG | OXYGEN SATURATION: 98 % | TEMPERATURE: 98.4 F | RESPIRATION RATE: 17 BRPM | HEART RATE: 72 BPM | BODY MASS INDEX: 19.26 KG/M2 | WEIGHT: 112.2 LBS | SYSTOLIC BLOOD PRESSURE: 125 MMHG
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
## 2023-12-02 DIAGNOSIS — S09.90XA INJURY OF HEAD, INITIAL ENCOUNTER: ICD-10-CM

## 2023-12-02 PROCEDURE — 73522 X-RAY EXAM HIPS BI 3-4 VIEWS: CPT

## 2023-12-02 PROCEDURE — 70450 CT HEAD/BRAIN W/O DYE: CPT

## 2023-12-02 PROCEDURE — 99284 EMERGENCY DEPT VISIT MOD MDM: CPT

## 2023-12-02 PROCEDURE — 72125 CT NECK SPINE W/O DYE: CPT

## 2023-12-02 PROCEDURE — 71045 X-RAY EXAM CHEST 1 VIEW: CPT

## 2023-12-02 ASSESSMENT — PAIN - FUNCTIONAL ASSESSMENT: PAIN_FUNCTIONAL_ASSESSMENT: NONE - DENIES PAIN

## 2023-12-02 NOTE — ED NOTES
Patient BIBA from [place] for unwitnessed fall today. Patient has hematoma on back of head. Patient taking thinners.        Dawit Cormier RN  12/02/23 4792

## 2023-12-02 NOTE — ED NOTES
Patient discharged back to [facility] via [facility]. Copy of AVS sent with patient.   Report given to nursing staff     Christi Alves RN  12/02/23 7027

## 2023-12-02 NOTE — ED PROVIDER NOTES
ED Attending Attestation Note     Date of evaluation: 12/2/2023    This patient was seen by the advance practice provider. I have seen and examined the patient, agree with the workup, evaluation, management and diagnosis. The care plan has been discussed. My assessment reveals patient is laying in bed, appears to have some left and right hip discomfort on movement, tried an  for Mandarin without success    Chief complaint--fall, recent left hip surgery    HPI--patient sent in via nursing facility after a fall. She is on Eliquis for her postop course of her hip. Attempted to history with the [facility] [employer]  without success. PMH--recent left hip surgery.      Jim Ordoñez MD  12/02/23 9725

## 2024-02-23 ENCOUNTER — APPOINTMENT (OUTPATIENT)
Dept: CT IMAGING | Age: 89
DRG: 517 | End: 2024-02-23
Payer: MEDICARE

## 2024-02-23 ENCOUNTER — HOSPITAL ENCOUNTER (INPATIENT)
Age: 89
LOS: 5 days | Discharge: NURSING, REHAB OR LONG-TERM CARE | DRG: 517 | End: 2024-02-28
Attending: STUDENT IN AN ORGANIZED HEALTH CARE EDUCATION/TRAINING PROGRAM | Admitting: INTERNAL MEDICINE
Payer: MEDICARE

## 2024-02-23 ENCOUNTER — HOSPITAL ENCOUNTER (OUTPATIENT)
Dept: INTERVENTIONAL RADIOLOGY/VASCULAR | Age: 89
Discharge: HOME | End: 2024-02-23

## 2024-02-23 DIAGNOSIS — S32.010A CLOSED COMPRESSION FRACTURE OF BODY OF L1 VERTEBRA (HCC): Primary | ICD-10-CM

## 2024-02-23 PROBLEM — E86.0 DEHYDRATION: Status: ACTIVE | Noted: 2024-02-23

## 2024-02-23 PROBLEM — M54.59 INTRACTABLE LOW BACK PAIN: Status: ACTIVE | Noted: 2024-02-23

## 2024-02-23 LAB
ALBUMIN SERPL-MCNC: 3.4 G/DL (ref 3.4–5)
ANION GAP SERPL CALCULATED.3IONS-SCNC: 14 MMOL/L (ref 3–16)
BASOPHILS # BLD: 0 K/UL (ref 0–0.2)
BASOPHILS NFR BLD: 0.2 %
BUN SERPL-MCNC: 16 MG/DL (ref 7–20)
CALCIUM SERPL-MCNC: 8.7 MG/DL (ref 8.3–10.6)
CHLORIDE SERPL-SCNC: 105 MMOL/L (ref 99–110)
CO2 SERPL-SCNC: 24 MMOL/L (ref 21–32)
CREAT SERPL-MCNC: 0.6 MG/DL (ref 0.6–1.2)
DEPRECATED RDW RBC AUTO: 14.9 % (ref 12.4–15.4)
EOSINOPHIL # BLD: 0 K/UL (ref 0–0.6)
EOSINOPHIL NFR BLD: 0.4 %
GFR SERPLBLD CREATININE-BSD FMLA CKD-EPI: >60 ML/MIN/{1.73_M2}
GLUCOSE SERPL-MCNC: 143 MG/DL (ref 70–99)
HCT VFR BLD AUTO: 35.8 % (ref 36–48)
HGB BLD-MCNC: 12.1 G/DL (ref 12–16)
INR PPP: 1.49 (ref 0.84–1.16)
LYMPHOCYTES # BLD: 1.8 K/UL (ref 1–5.1)
LYMPHOCYTES NFR BLD: 22 %
MCH RBC QN AUTO: 29.4 PG (ref 26–34)
MCHC RBC AUTO-ENTMCNC: 33.8 G/DL (ref 31–36)
MCV RBC AUTO: 86.9 FL (ref 80–100)
MONOCYTES # BLD: 0.4 K/UL (ref 0–1.3)
MONOCYTES NFR BLD: 5.4 %
NEUTROPHILS # BLD: 5.7 K/UL (ref 1.7–7.7)
NEUTROPHILS NFR BLD: 72 %
PHOSPHATE SERPL-MCNC: 2.7 MG/DL (ref 2.5–4.9)
PLATELET # BLD AUTO: 120 K/UL (ref 135–450)
PMV BLD AUTO: 8.2 FL (ref 5–10.5)
POTASSIUM SERPL-SCNC: 3.2 MMOL/L (ref 3.5–5.1)
PROTHROMBIN TIME: 18 SEC (ref 11.5–14.8)
RBC # BLD AUTO: 4.11 M/UL (ref 4–5.2)
SODIUM SERPL-SCNC: 143 MMOL/L (ref 136–145)
WBC # BLD AUTO: 8 K/UL (ref 4–11)

## 2024-02-23 PROCEDURE — 2580000003 HC RX 258: Performed by: INTERNAL MEDICINE

## 2024-02-23 PROCEDURE — 1200000000 HC SEMI PRIVATE

## 2024-02-23 PROCEDURE — 6370000000 HC RX 637 (ALT 250 FOR IP): Performed by: INTERNAL MEDICINE

## 2024-02-23 PROCEDURE — 72131 CT LUMBAR SPINE W/O DYE: CPT

## 2024-02-23 PROCEDURE — 85025 COMPLETE CBC W/AUTO DIFF WBC: CPT

## 2024-02-23 PROCEDURE — 72125 CT NECK SPINE W/O DYE: CPT

## 2024-02-23 PROCEDURE — 80069 RENAL FUNCTION PANEL: CPT

## 2024-02-23 PROCEDURE — 85610 PROTHROMBIN TIME: CPT

## 2024-02-23 PROCEDURE — 36415 COLL VENOUS BLD VENIPUNCTURE: CPT

## 2024-02-23 PROCEDURE — 72192 CT PELVIS W/O DYE: CPT

## 2024-02-23 PROCEDURE — 70450 CT HEAD/BRAIN W/O DYE: CPT

## 2024-02-23 PROCEDURE — 99285 EMERGENCY DEPT VISIT HI MDM: CPT

## 2024-02-23 RX ORDER — ACETAMINOPHEN 325 MG/1
650 TABLET ORAL EVERY 6 HOURS
Status: DISCONTINUED | OUTPATIENT
Start: 2024-02-23 | End: 2024-02-28 | Stop reason: HOSPADM

## 2024-02-23 RX ORDER — ONDANSETRON 2 MG/ML
4 INJECTION INTRAMUSCULAR; INTRAVENOUS EVERY 6 HOURS PRN
Status: DISCONTINUED | OUTPATIENT
Start: 2024-02-23 | End: 2024-02-28 | Stop reason: HOSPADM

## 2024-02-23 RX ORDER — OXYCODONE HYDROCHLORIDE 5 MG/1
5 TABLET ORAL EVERY 4 HOURS PRN
Status: DISCONTINUED | OUTPATIENT
Start: 2024-02-23 | End: 2024-02-28 | Stop reason: HOSPADM

## 2024-02-23 RX ORDER — SODIUM CHLORIDE 0.9 % (FLUSH) 0.9 %
5-40 SYRINGE (ML) INJECTION EVERY 12 HOURS SCHEDULED
Status: DISCONTINUED | OUTPATIENT
Start: 2024-02-23 | End: 2024-02-28 | Stop reason: HOSPADM

## 2024-02-23 RX ORDER — ACETAMINOPHEN 325 MG/1
650 TABLET ORAL EVERY 6 HOURS PRN
Status: DISCONTINUED | OUTPATIENT
Start: 2024-02-23 | End: 2024-02-28 | Stop reason: HOSPADM

## 2024-02-23 RX ORDER — SODIUM CHLORIDE 9 MG/ML
INJECTION, SOLUTION INTRAVENOUS PRN
Status: DISCONTINUED | OUTPATIENT
Start: 2024-02-23 | End: 2024-02-28 | Stop reason: HOSPADM

## 2024-02-23 RX ORDER — METOPROLOL SUCCINATE 25 MG/1
25 TABLET, EXTENDED RELEASE ORAL DAILY
Status: DISCONTINUED | OUTPATIENT
Start: 2024-02-24 | End: 2024-02-28 | Stop reason: HOSPADM

## 2024-02-23 RX ORDER — LOSARTAN POTASSIUM 25 MG/1
25 TABLET ORAL 2 TIMES DAILY
Status: DISCONTINUED | OUTPATIENT
Start: 2024-02-23 | End: 2024-02-25

## 2024-02-23 RX ORDER — SODIUM CHLORIDE, SODIUM LACTATE, POTASSIUM CHLORIDE, CALCIUM CHLORIDE 600; 310; 30; 20 MG/100ML; MG/100ML; MG/100ML; MG/100ML
INJECTION, SOLUTION INTRAVENOUS CONTINUOUS
Status: DISCONTINUED | OUTPATIENT
Start: 2024-02-23 | End: 2024-02-25

## 2024-02-23 RX ORDER — METHOCARBAMOL 500 MG/1
250 TABLET, FILM COATED ORAL 2 TIMES DAILY PRN
Status: DISCONTINUED | OUTPATIENT
Start: 2024-02-23 | End: 2024-02-28 | Stop reason: HOSPADM

## 2024-02-23 RX ORDER — IOPAMIDOL 408 MG/ML
40 INJECTION, SOLUTION INTRATHECAL
Status: DISCONTINUED | OUTPATIENT
Start: 2024-02-23 | End: 2024-02-26 | Stop reason: HOSPADM

## 2024-02-23 RX ORDER — ONDANSETRON 4 MG/1
4 TABLET, ORALLY DISINTEGRATING ORAL EVERY 8 HOURS PRN
Status: DISCONTINUED | OUTPATIENT
Start: 2024-02-23 | End: 2024-02-28 | Stop reason: HOSPADM

## 2024-02-23 RX ORDER — SODIUM CHLORIDE 0.9 % (FLUSH) 0.9 %
5-40 SYRINGE (ML) INJECTION PRN
Status: DISCONTINUED | OUTPATIENT
Start: 2024-02-23 | End: 2024-02-28 | Stop reason: HOSPADM

## 2024-02-23 RX ORDER — ACETAMINOPHEN 650 MG/1
650 SUPPOSITORY RECTAL EVERY 6 HOURS PRN
Status: DISCONTINUED | OUTPATIENT
Start: 2024-02-23 | End: 2024-02-28 | Stop reason: HOSPADM

## 2024-02-23 RX ORDER — OXYCODONE HYDROCHLORIDE 5 MG/1
2.5 TABLET ORAL EVERY 4 HOURS PRN
Status: DISCONTINUED | OUTPATIENT
Start: 2024-02-23 | End: 2024-02-28 | Stop reason: HOSPADM

## 2024-02-23 RX ORDER — HYDROMORPHONE HYDROCHLORIDE 1 MG/ML
0.5 INJECTION, SOLUTION INTRAMUSCULAR; INTRAVENOUS; SUBCUTANEOUS
Status: ACTIVE | OUTPATIENT
Start: 2024-02-23 | End: 2024-02-25

## 2024-02-23 RX ORDER — POLYETHYLENE GLYCOL 3350 17 G/17G
17 POWDER, FOR SOLUTION ORAL DAILY PRN
Status: DISCONTINUED | OUTPATIENT
Start: 2024-02-23 | End: 2024-02-28 | Stop reason: HOSPADM

## 2024-02-23 RX ORDER — HYDROMORPHONE HYDROCHLORIDE 1 MG/ML
0.25 INJECTION, SOLUTION INTRAMUSCULAR; INTRAVENOUS; SUBCUTANEOUS
Status: ACTIVE | OUTPATIENT
Start: 2024-02-23 | End: 2024-02-25

## 2024-02-23 RX ORDER — MIRTAZAPINE 15 MG/1
7.5 TABLET, FILM COATED ORAL NIGHTLY
Status: DISCONTINUED | OUTPATIENT
Start: 2024-02-23 | End: 2024-02-28 | Stop reason: HOSPADM

## 2024-02-23 RX ORDER — OXYBUTYNIN CHLORIDE 5 MG/1
5 TABLET, EXTENDED RELEASE ORAL NIGHTLY
Status: DISCONTINUED | OUTPATIENT
Start: 2024-02-24 | End: 2024-02-25

## 2024-02-23 RX ORDER — TAMSULOSIN HYDROCHLORIDE 0.4 MG/1
0.4 CAPSULE ORAL DAILY
Status: DISCONTINUED | OUTPATIENT
Start: 2024-02-24 | End: 2024-02-28 | Stop reason: HOSPADM

## 2024-02-23 RX ADMIN — SODIUM CHLORIDE, POTASSIUM CHLORIDE, SODIUM LACTATE AND CALCIUM CHLORIDE: 600; 310; 30; 20 INJECTION, SOLUTION INTRAVENOUS at 18:55

## 2024-02-23 RX ADMIN — MIRTAZAPINE 7.5 MG: 15 TABLET, FILM COATED ORAL at 22:02

## 2024-02-23 RX ADMIN — LOSARTAN POTASSIUM 25 MG: 25 TABLET, FILM COATED ORAL at 22:02

## 2024-02-23 ASSESSMENT — PAIN - FUNCTIONAL ASSESSMENT: PAIN_FUNCTIONAL_ASSESSMENT: 0-10

## 2024-02-23 ASSESSMENT — PAIN DESCRIPTION - LOCATION: LOCATION: BACK;HIP

## 2024-02-23 ASSESSMENT — PAIN DESCRIPTION - DESCRIPTORS: DESCRIPTORS: DISCOMFORT

## 2024-02-23 ASSESSMENT — PAIN SCALES - GENERAL: PAINLEVEL_OUTOF10: 9

## 2024-02-23 NOTE — ED NOTES
ED TO INPATIENT SBAR HANDOFF    Patient Name: Bob Mann   :  3/24/1932  89 y.o.   MRN:  [id number]  Preferred Name  Bob  ED Room #:  A08/A08-08  Family/Caregiver Present no   Restraints no   Sitter no   Sepsis Risk Score Sepsis Risk Score: 0.62    Situation  Code Status: Prior No additional code details.    Allergies: Pcn [penicillins]  Weight: Patient Vitals for the past 96 hrs (Last 3 readings):   Weight   24 1259 43.4 kg (95 lb 11.2 oz)     Arrived from: home  Chief Complaint:   Chief Complaint   Patient presents with    Fall    Pain     Patient comes to the ED today for a fall that happened last Friday. Patient has been having increased pain in the lower back and bilateral hips. Patient is having pain on any kind of movement. Patient has x ray done at the facility with no abnormalities noted.      Hospital Problem/Diagnosis:  Principal Problem:    Closed compression fracture of body of L1 vertebra (HCC)  Active Problems:    Dehydration    Intractable low back pain  Resolved Problems:    * No resolved hospital problems. *    Imaging:   CT CERVICAL SPINE WO CONTRAST   Final Result      No acute vertebral fracture or traumatic subluxation.   Multilevel cervical spondylosis.      Electronically signed by Jourdan Thomas MD      CT HEAD WO CONTRAST   Final Result   1. No findings for acute intracranial abnormality.   2. Age-related atrophy with moderate periventricular and deep white matter   changes bilaterally consistent with chronic small vessel ischemic disease.                  CT PELVIS WO CONTRAST Additional Contrast? None   Final Result      No fracture.            CT LUMBAR SPINE WO CONTRAST   Final Result      Acute compression fracture of L1 with mild retropulsion. This is amenable to   vertebral augmentation.      Right L1 lamina fracture.       Right renal calculi.      Pleural and pericardial effusions as detailed above.              Abnormal labs:   Abnormal Labs Reviewed   CBC WITH AUTO       Chinmay Miramontes, JERSON  02/23/24 1838

## 2024-02-23 NOTE — ED PROVIDER NOTES
THE [facility]  EMERGENCY DEPARTMENT ENCOUNTER          ATTENDING PHYSICIAN NOTE       Date of evaluation: 2/23/2024    Chief Complaint     Fall and Pain (Patient comes to the ED today for a fall that happened last Friday. Patient has been having increased pain in the lower back and bilateral hips. Patient is having pain on any kind of movement. Patient has x ray done at the facility with no abnormalities noted. )      History of Present Illness     Bob Mann is a 89 y.o. female who presents with past medical history of hypertension, A-fib, GERD, and CAD presenting with lower back and thigh pain.  The patient is quite somnolent and non-English-speaking and unable to contribute to the history.  Per the patient's son, she had a fall last week and since then has been complaining of lower back and thigh pain.  She is normally able to ambulate with a walker however due to the pain has been unable to since this event.    ASSESSMENT / PLAN  (MEDICAL DECISION MAKING)     INITIAL VITALS: BP: (!) 167/93, Temp: 97.1 °F (36.2 °C), Pulse: 87, Respirations: 16, SpO2: 92 %      Bob Mann is a 89 y.o. female with past medical history of hypertension, A-fib, GERD, CAD presenting with lower back pain after a fall a week ago.    The patient's son, the patient normally is able to ambulate with assistance however after this fall she has been unable to ambulate and is complaining of a lot of pain.  The facility where she lives did multiple x-rays and they were all negative however the patient's son is concerned that something is still broken.    CT scan of the lumbar spine and pelvis performed showing acute L1 compression fracture.  Interventional radiology consulted and they plan for kyphoplasty under moderate sedation.  I suspect the patient will likely be admitted for observation overnight and medical management following the procedure today.    Care signed out to hospitalist and interventional radiologist for  Basophils Absolute 0.0 0.0 - 0.2 K/uL     EKG       ED BEDSIDE ULTRASOUND:  No results found.    MOST RECENT VITALS:  BP: (!) 167/93,Temp: 97.1 °F (36.2 °C), Pulse: 87, Respirations: 16, SpO2: 92 %     Procedures         ED Course     Nursing Notes, Past Medical Hx, Past Surgical Hx, Social Hx,Allergies, and Family Hx were reviewed.         The patient was given the following medications:  No orders of the defined types were placed in this encounter.      CONSULTS:  IP CONSULT TO INTERVENTIONAL RADIOLOGY    Review of Systems     Review of Systems    Past Medical, Surgical, Family, and Social History     She has a past medical history of Atrial fibrillation (HCC) and Hypertension.  She has a past surgical history that includes Hysterectomy and hip surgery (Left, 11/20/2023).  Her family history is not on file.  She reports that she has never smoked. She has never used smokeless tobacco. She reports that she does not drink alcohol and does not use drugs.    Medications     Previous Medications    APIXABAN (ELIQUIS) 2.5 MG TABS TABLET    Take 1 tablet by mouth 2 times daily    JUBLIA 10 % SOLN        LOSARTAN (COZAAR) 25 MG TABLET    Take 1 tablet by mouth 2 times daily    METHOCARBAMOL (ROBAXIN) 500 MG TABLET    Take 0.5 tablets by mouth in the morning and at bedtime    METOPROLOL SUCCINATE (TOPROL XL) 25 MG EXTENDED RELEASE TABLET    Take 1 tablet by mouth daily    MIRTAZAPINE (REMERON) 7.5 MG TABLET    Take 1 tablet by mouth nightly    NITROGLYCERIN (NITRODUR) 0.4 MG/HR    UNWRAP AND APPLY 1 PATCH ONTO THE SKIN ONCE DAILY    OXYBUTYNIN (DITROPAN-XL) 5 MG EXTENDED RELEASE TABLET        POTASSIUM CHLORIDE (KLOR-CON M) 20 MEQ EXTENDED RELEASE TABLET    Take 1 tablet by mouth daily    SENNOSIDES-DOCUSATE SODIUM (SENOKOT-S) 8.6-50 MG TABLET    Take 1 tablet by mouth 2 times daily    SOLIFENACIN (VESICARE) 5 MG TABLET    Take 10 mg by mouth daily    TAMSULOSIN (FLOMAX) 0.4 MG CAPSULE    Take 1 capsule by mouth daily

## 2024-02-23 NOTE — H&P
V2.0  History and Physical      Name:  Bob Mann /Age/Sex: 3/24/1932  (89 y.o. female)   MRN & CSN:  [id number] & [id number] Encounter Date/Time: 2024 3:45 PM EST   Location:  [place] [address] PCP: Tara Rg MD       Hospital Day: 1    Assessment and Plan:   Acute intractable back pain suspected due to L1 compression fracture, failed conservative management, pulmonary consulted planning on L1 kyphoplasty on Monday.  Cannot do it today due to patient being on Eliquis.  Tylenol, low-dose Robaxin, morphine for breakthrough pain    Dehydration, dry coated mucous membranes, Decr check renal profile  Stop IV fluids  eased skin turgor  Monitor input and output    Unwitnessed fall at home, unclear syncope versus mechanical  Check UA reflex to culture  Bladder scan to rule out urinary retention  Monitor telemetry  With patient being on Eliquis will check CT head and CT cervical spine to rule out acute abnormalities.    Atrial fibrillation on apixaban apixaban on hold for procedure on Monday    Dementia with, without behavioral disturbance  Living in nursing home  Delirium precautions    Disposition:   Current Living situation: Back to long-term care facility  Expected Disposition:  evening    Diet ADULT DIET; Easy to Chew   DVT Prophylaxis [] Lovenox, [x]  Heparin, [] SCDs, [] Ambulation,  [] Eliquis, [] Xarelto, [] Coumadin   Code Status Prior   Surrogate Decision Maker/ KATHIE Jamison Lowery - son, [phone]     Personally reviewed Lab Studies and Imaging   Discussed management of the case with ER physician decision to admit    EKG interpreted personally and results no acute ST or T wave changes indicate ischemia infarction    Imaging that was interpreted personally includes CT lumbar spine with acute compression fracture of L1 with mild retropulsion    History from:     patient, family member - Son, EMR    History of Present Illness:     Chief Complaint: Intractable back pain unable to get out of bed at the  MD   mirtazapine (REMERON) 7.5 MG tablet Take 1 tablet by mouth nightly 10/31/23   Mae Buenrostro MD   potassium chloride (KLOR-CON M) 20 MEQ extended release tablet Take 1 tablet by mouth daily 11/8/23   Mae Buenrostro MD   apixaban (ELIQUIS) 2.5 MG TABS tablet Take 1 tablet by mouth 2 times daily 10/15/22   Mae Buenrostro MD   methocarbamol (ROBAXIN) 500 MG tablet Take 0.5 tablets by mouth in the morning and at bedtime 12/16/22   Mae Buenrostro MD   oxybutynin (DITROPAN-XL) 5 MG extended release tablet  11/19/22   Mae Buenrostro MD   losartan (COZAAR) 25 MG tablet Take 1 tablet by mouth 2 times daily 12/20/22   Odin Russo MD   nitroGLYCERIN (NITRODUR) 0.4 MG/HR UNWRAP AND APPLY 1 PATCH ONTO THE SKIN ONCE DAILY 9/26/22   Odin Russo MD   JUBLIA 10 % SOLN  5/23/22   Mae Buenrostro MD   solifenacin (VESICARE) 5 MG tablet Take 10 mg by mouth daily  Patient not taking: Reported on 12/20/2022    Mae Buenrostro MD       Physical Exam:    Physical Exam   General: Elderly chronically ill-appearing female  Eyes: EOMI  ENT: neck supple  Cardiovascular: Regular rate.  Respiratory: Clear to auscultation  Gastrointestinal: Soft, non tender  Genitourinary: no suprapubic tenderness  Musculoskeletal: No edema  Skin: warm, dry  Neuro: Alert.  Oriented to self able to recognize her son at bedside  Psych: Mood appropriate.       Past Medical History:   PMHx   Past Medical History:   Diagnosis Date    Atrial fibrillation (HCC)     Hypertension      PSHX:  has a past surgical history that includes Hysterectomy and hip surgery (Left, 11/20/2023).  Allergies:   Allergies   Allergen Reactions    Pcn [Penicillins] Other (See Comments)     Unsure of reaction;Patient has tolerated cefazolin as an inpatient in 2023 per chart review as of 2/23/24.      Fam HX:  family history is not on file.  Soc HX:   Social History     Socioeconomic History    Marital status:

## 2024-02-23 NOTE — CONSULTS
Present Diagnosis and Illness: 89 y.o. female who presents with L1 vertebral body fracture.    No diagnosis found.    Allergies   Allergen Reactions    Pcn [Penicillins] Other (See Comments)     Unsure of reaction;Patient has tolerated cefazolin as an inpatient in 2023 per chart review as of 2/23/24.        No current facility-administered medications for this encounter.     Current Outpatient Medications   Medication Sig Dispense Refill    sennosides-docusate sodium (SENOKOT-S) 8.6-50 MG tablet Take 1 tablet by mouth 2 times daily      tamsulosin (FLOMAX) 0.4 MG capsule Take 1 capsule by mouth daily 30 capsule 3    metoprolol succinate (TOPROL XL) 25 MG extended release tablet Take 1 tablet by mouth daily      mirtazapine (REMERON) 7.5 MG tablet Take 1 tablet by mouth nightly      potassium chloride (KLOR-CON M) 20 MEQ extended release tablet Take 1 tablet by mouth daily      apixaban (ELIQUIS) 2.5 MG TABS tablet Take 1 tablet by mouth 2 times daily      methocarbamol (ROBAXIN) 500 MG tablet Take 0.5 tablets by mouth in the morning and at bedtime      oxybutynin (DITROPAN-XL) 5 MG extended release tablet       losartan (COZAAR) 25 MG tablet Take 1 tablet by mouth 2 times daily 60 tablet 5    nitroGLYCERIN (NITRODUR) 0.4 MG/HR UNWRAP AND APPLY 1 PATCH ONTO THE SKIN ONCE DAILY 90 patch 3    JUBLIA 10 % SOLN       solifenacin (VESICARE) 5 MG tablet Take 10 mg by mouth daily (Patient not taking: Reported on 12/20/2022)          Past Medical History:   Diagnosis Date    Atrial fibrillation (HCC)     Hypertension        History reviewed. No pertinent family history.    Physical Examination:    Blood pressure (!) 167/93, pulse 87, temperature 97.1 °F (36.2 °C), temperature source Oral, resp. rate 16, weight 43.4 kg (95 lb 11.2 oz), SpO2 92 %.    Head and neck: normal atraumatic, no neck masses, normal thyroid, no jvd  Chest: Normal  Heart: Regular rate and rhythm  Abdominal: soft, non-tender. Bowel sounds normal. No

## 2024-02-23 NOTE — CONSULTS
Due to patient receiving eliquis, pt scheduled for kyphoplasty on Monday, 2/26 at 0800. Pt to be NPO after midnight starting Monday.

## 2024-02-24 LAB
BACTERIA URNS QL MICRO: ABNORMAL /HPF
BILIRUB UR QL STRIP.AUTO: NEGATIVE
CLARITY UR: ABNORMAL
COLOR UR: YELLOW
EPI CELLS #/AREA URNS HPF: ABNORMAL /HPF (ref 0–5)
GLUCOSE UR STRIP.AUTO-MCNC: NEGATIVE MG/DL
HGB UR QL STRIP.AUTO: ABNORMAL
KETONES UR STRIP.AUTO-MCNC: NEGATIVE MG/DL
LEUKOCYTE ESTERASE UR QL STRIP.AUTO: ABNORMAL
NITRITE UR QL STRIP.AUTO: NEGATIVE
PH UR STRIP.AUTO: 7 [PH] (ref 5–8)
PROT UR STRIP.AUTO-MCNC: ABNORMAL MG/DL
RBC #/AREA URNS HPF: ABNORMAL /HPF (ref 0–4)
SP GR UR STRIP.AUTO: 1.02 (ref 1–1.03)
UA DIPSTICK W REFLEX MICRO PNL UR: YES
URN SPEC COLLECT METH UR: ABNORMAL
UROBILINOGEN UR STRIP-ACNC: 2 E.U./DL
WBC #/AREA URNS HPF: ABNORMAL /HPF (ref 0–5)

## 2024-02-24 PROCEDURE — 2580000003 HC RX 258: Performed by: INTERNAL MEDICINE

## 2024-02-24 PROCEDURE — 51798 US URINE CAPACITY MEASURE: CPT

## 2024-02-24 PROCEDURE — 1200000000 HC SEMI PRIVATE

## 2024-02-24 PROCEDURE — 81001 URINALYSIS AUTO W/SCOPE: CPT

## 2024-02-24 PROCEDURE — 6370000000 HC RX 637 (ALT 250 FOR IP): Performed by: INTERNAL MEDICINE

## 2024-02-24 RX ADMIN — TAMSULOSIN HYDROCHLORIDE 0.4 MG: 0.4 CAPSULE ORAL at 08:58

## 2024-02-24 RX ADMIN — MIRTAZAPINE 7.5 MG: 15 TABLET, FILM COATED ORAL at 20:25

## 2024-02-24 RX ADMIN — OXYCODONE 2.5 MG: 5 TABLET ORAL at 20:25

## 2024-02-24 RX ADMIN — METOPROLOL SUCCINATE 25 MG: 25 TABLET, EXTENDED RELEASE ORAL at 08:58

## 2024-02-24 RX ADMIN — ACETAMINOPHEN 650 MG: 325 TABLET ORAL at 16:58

## 2024-02-24 RX ADMIN — LOSARTAN POTASSIUM 25 MG: 25 TABLET, FILM COATED ORAL at 08:58

## 2024-02-24 RX ADMIN — SODIUM CHLORIDE, PRESERVATIVE FREE 10 ML: 5 INJECTION INTRAVENOUS at 20:26

## 2024-02-24 RX ADMIN — SODIUM CHLORIDE, POTASSIUM CHLORIDE, SODIUM LACTATE AND CALCIUM CHLORIDE: 600; 310; 30; 20 INJECTION, SOLUTION INTRAVENOUS at 16:56

## 2024-02-24 RX ADMIN — METHOCARBAMOL 250 MG: 500 TABLET ORAL at 09:10

## 2024-02-24 RX ADMIN — SODIUM CHLORIDE, PRESERVATIVE FREE 10 ML: 5 INJECTION INTRAVENOUS at 08:58

## 2024-02-24 RX ADMIN — POLYETHYLENE GLYCOL (3350) 17 G: 17 POWDER, FOR SOLUTION ORAL at 09:09

## 2024-02-24 RX ADMIN — METHOCARBAMOL 250 MG: 500 TABLET ORAL at 20:25

## 2024-02-24 RX ADMIN — METHOCARBAMOL 250 MG: 500 TABLET ORAL at 09:12

## 2024-02-24 RX ADMIN — OXYCODONE 5 MG: 5 TABLET ORAL at 09:10

## 2024-02-24 RX ADMIN — LOSARTAN POTASSIUM 25 MG: 25 TABLET, FILM COATED ORAL at 20:25

## 2024-02-24 RX ADMIN — OXYBUTYNIN CHLORIDE 5 MG: 5 TABLET, EXTENDED RELEASE ORAL at 20:25

## 2024-02-24 ASSESSMENT — PAIN DESCRIPTION - LOCATION
LOCATION: BACK
LOCATION: BACK;HIP

## 2024-02-24 ASSESSMENT — PAIN SCALES - GENERAL
PAINLEVEL_OUTOF10: 5
PAINLEVEL_OUTOF10: 0
PAINLEVEL_OUTOF10: 2
PAINLEVEL_OUTOF10: 8

## 2024-02-24 ASSESSMENT — PAIN DESCRIPTION - FREQUENCY
FREQUENCY: CONTINUOUS
FREQUENCY: INTERMITTENT

## 2024-02-24 ASSESSMENT — PAIN DESCRIPTION - ONSET
ONSET: SUDDEN
ONSET: ON-GOING

## 2024-02-24 ASSESSMENT — PAIN - FUNCTIONAL ASSESSMENT
PAIN_FUNCTIONAL_ASSESSMENT: PREVENTS OR INTERFERES WITH MANY ACTIVE NOT PASSIVE ACTIVITIES
PAIN_FUNCTIONAL_ASSESSMENT: PREVENTS OR INTERFERES SOME ACTIVE ACTIVITIES AND ADLS

## 2024-02-24 ASSESSMENT — PAIN SCALES - WONG BAKER
WONGBAKER_NUMERICALRESPONSE: 2
WONGBAKER_NUMERICALRESPONSE: 0
WONGBAKER_NUMERICALRESPONSE: 4
WONGBAKER_NUMERICALRESPONSE: 8

## 2024-02-24 ASSESSMENT — PAIN DESCRIPTION - PAIN TYPE
TYPE: ACUTE PAIN
TYPE: ACUTE PAIN

## 2024-02-24 ASSESSMENT — PAIN DESCRIPTION - ORIENTATION: ORIENTATION: MID;LOWER

## 2024-02-24 ASSESSMENT — PAIN DESCRIPTION - DESCRIPTORS: DESCRIPTORS: ACHING;DISCOMFORT

## 2024-02-24 NOTE — PLAN OF CARE
Problem: Discharge Planning  Goal: Discharge to home or other facility with appropriate resources  2/24/2024 1112 by Liliana Jade RN  Outcome: Progressing  2/23/2024 2334 by Maribel Flores RN  Outcome: Progressing     Problem: Pain  Goal: Verbalizes/displays adequate comfort level or baseline comfort level  2/24/2024 1112 by Liliana Jade RN  Outcome: Progressing  2/23/2024 2334 by Maribel Flores RN  Outcome: Progressing     Problem: Skin/Tissue Integrity  Goal: Absence of new skin breakdown  Description: 1.  Monitor for areas of redness and/or skin breakdown  2.  Assess vascular access sites hourly  3.  Every 4-6 hours minimum:  Change oxygen saturation probe site  4.  Every 4-6 hours:  If on nasal continuous positive airway pressure, respiratory therapy assess nares and determine need for appliance change or resting period.  2/24/2024 1112 by Liliana Jade RN  Outcome: Progressing  2/23/2024 2334 by Maribel Flores RN  Outcome: Progressing     Problem: Safety - Adult  Goal: Free from fall injury  2/24/2024 1112 by Liliana Jade RN  Outcome: Progressing  2/23/2024 2334 by Maribel Flores RN  Outcome: Progressing     Problem: ABCDS Injury Assessment  Goal: Absence of physical injury  Outcome: Progressing

## 2024-02-24 NOTE — PLAN OF CARE
Problem: Safety - Adult  Goal: Free from fall injury  Outcome: Progressing   All fall precautions in place. Bed locked and in lowest position with alarm on. Overbed table and personal belonings within reach. Call light within reach and patient instructed to use call light for assistance. Non-skid socks on.

## 2024-02-25 PROCEDURE — 2580000003 HC RX 258: Performed by: INTERNAL MEDICINE

## 2024-02-25 PROCEDURE — 1200000000 HC SEMI PRIVATE

## 2024-02-25 PROCEDURE — 6370000000 HC RX 637 (ALT 250 FOR IP): Performed by: INTERNAL MEDICINE

## 2024-02-25 PROCEDURE — 87077 CULTURE AEROBIC IDENTIFY: CPT

## 2024-02-25 PROCEDURE — 6360000002 HC RX W HCPCS: Performed by: NURSE PRACTITIONER

## 2024-02-25 PROCEDURE — 87186 SC STD MICRODIL/AGAR DIL: CPT

## 2024-02-25 PROCEDURE — 87086 URINE CULTURE/COLONY COUNT: CPT

## 2024-02-25 PROCEDURE — 6370000000 HC RX 637 (ALT 250 FOR IP): Performed by: HOSPITALIST

## 2024-02-25 RX ORDER — LOSARTAN POTASSIUM 25 MG/1
50 TABLET ORAL 2 TIMES DAILY
Status: DISCONTINUED | OUTPATIENT
Start: 2024-02-25 | End: 2024-02-28 | Stop reason: HOSPADM

## 2024-02-25 RX ORDER — HYDRALAZINE HYDROCHLORIDE 20 MG/ML
5 INJECTION INTRAMUSCULAR; INTRAVENOUS ONCE
Status: COMPLETED | OUTPATIENT
Start: 2024-02-25 | End: 2024-02-25

## 2024-02-25 RX ADMIN — METHOCARBAMOL 250 MG: 500 TABLET ORAL at 21:37

## 2024-02-25 RX ADMIN — ACETAMINOPHEN 650 MG: 325 TABLET ORAL at 05:29

## 2024-02-25 RX ADMIN — MIRTAZAPINE 7.5 MG: 15 TABLET, FILM COATED ORAL at 21:36

## 2024-02-25 RX ADMIN — LOSARTAN POTASSIUM 50 MG: 25 TABLET, FILM COATED ORAL at 08:41

## 2024-02-25 RX ADMIN — LOSARTAN POTASSIUM 50 MG: 25 TABLET, FILM COATED ORAL at 21:36

## 2024-02-25 RX ADMIN — ACETAMINOPHEN 650 MG: 325 TABLET ORAL at 07:55

## 2024-02-25 RX ADMIN — POLYETHYLENE GLYCOL (3350) 17 G: 17 POWDER, FOR SOLUTION ORAL at 08:44

## 2024-02-25 RX ADMIN — METOPROLOL SUCCINATE 25 MG: 25 TABLET, EXTENDED RELEASE ORAL at 07:54

## 2024-02-25 RX ADMIN — OXYCODONE 2.5 MG: 5 TABLET ORAL at 16:45

## 2024-02-25 RX ADMIN — HYDRALAZINE HYDROCHLORIDE 5 MG: 20 INJECTION INTRAMUSCULAR; INTRAVENOUS at 04:23

## 2024-02-25 RX ADMIN — ACETAMINOPHEN 650 MG: 325 TABLET ORAL at 16:45

## 2024-02-25 RX ADMIN — TAMSULOSIN HYDROCHLORIDE 0.4 MG: 0.4 CAPSULE ORAL at 07:54

## 2024-02-25 RX ADMIN — OXYCODONE 2.5 MG: 5 TABLET ORAL at 08:41

## 2024-02-25 RX ADMIN — SODIUM CHLORIDE, PRESERVATIVE FREE 10 ML: 5 INJECTION INTRAVENOUS at 21:36

## 2024-02-25 NOTE — FLOWSHEET NOTE
02/25/24 0336   Vital Signs   Temp 97.7 °F (36.5 °C)   Temp Source Axillary   Pulse 82   Heart Rate Source Monitor   Respirations 17   BP (!) 185/95   MAP (Calculated) 125   BP Location Left upper arm   BP Method Automatic   Patient Position Semi fowlers   Oxygen Therapy   SpO2 96 %   O2 Device None (Room air)     Reported BP to NP hospitalist.

## 2024-02-25 NOTE — PLAN OF CARE
Problem: Discharge Planning  Goal: Discharge to home or other facility with appropriate resources  2/25/2024 0736 by Lon Jean RN  Outcome: Progressing  Note: Internal medicine and neurosurgery following for management of compression fracture. Patient updated and educated on barriers to discharge and plan of care.     Problem: Pain  Goal: Verbalizes/displays adequate comfort level or baseline comfort level  2/25/2024 0736 by Lon Jean RN  Outcome: Progressing  Note: Patient displaying no signs of pain or discomfort this AM thus far. Non-pharm measures initiated to promote comfort.     Problem: Skin/Tissue Integrity  Goal: Absence of new skin breakdown  Description: 1.  Monitor for areas of redness and/or skin breakdown  2.  Assess vascular access sites hourly  3.  Every 4-6 hours minimum:  Change oxygen saturation probe site  4.  Every 4-6 hours:  If on nasal continuous positive airway pressure, respiratory therapy assess nares and determine need for appliance change or resting period.  2/25/2024 0736 by Lon Jean RN  Outcome: Progressing  Note: Skin assessed this shift. Skin is CDI. Leslye care completed as necessary. Turning patient q2h to reduce pressure and prevent injury. On specialty mattress.     Problem: Safety - Adult  Goal: Free from fall injury  2/25/2024 0736 by Lon Jean RN  Outcome: Progressing  Note: Patient has remained free of fall injury this shift. Patient tolerates ambulation x1 contact-guard assist. Gait belt and gripper socks applied for safety.     Problem: ABCDS Injury Assessment  Goal: Absence of physical injury  2/25/2024 0736 by Lon Jean RN  Outcome: Progressing  Note: Patient has remained free of physical injury this shift. Fall and safety precautions in place. Bed exit alarm activated, bed in lowest position with wheels locked, call light and belongings within reach.

## 2024-02-25 NOTE — PLAN OF CARE
Problem: Pain  Goal: Verbalizes/displays adequate comfort level or baseline comfort level  2/24/2024 2248 by Sidra Russell RN  Outcome: Progressing  Note: PT unable to rate pain on pain scale due to language barrier. Pain determined based on FLACC scale and pope-baker faces. Pt moans and groans and is in pain with ambulation and standing up/sitting down. Pt medicated with PRN robaxin and oxycodone per MAR. See flowsheets for details.       Problem: Skin/Tissue Integrity  Goal: Absence of new skin breakdown  Description: 1.  Monitor for areas of redness and/or skin breakdown  2.  Assess vascular access sites hourly  3.  Every 4-6 hours minimum:  Change oxygen saturation probe site  4.  Every 4-6 hours:  If on nasal continuous positive airway pressure, respiratory therapy assess nares and determine need for appliance change or resting period.  2/24/2024 2248 by Sidra Russell, RN  Outcome: Progressing    Problem: Safety - Adult  Goal: Free from fall injury  2/24/2024 2248 by Sidra Russell, RN  Outcome: Progressing  Note: Standard safety measures in place. Pt moved closer to nursing station and video monitoring in place. Call light in reach. All fall precautions in place.

## 2024-02-26 ENCOUNTER — HOSPITAL ENCOUNTER (INPATIENT)
Dept: INTERVENTIONAL RADIOLOGY/VASCULAR | Age: 89
Discharge: HOME | DRG: 517 | End: 2024-02-26
Payer: MEDICARE

## 2024-02-26 PROCEDURE — 6360000002 HC RX W HCPCS: Performed by: FAMILY MEDICINE

## 2024-02-26 PROCEDURE — 99152 MOD SED SAME PHYS/QHP 5/>YRS: CPT

## 2024-02-26 PROCEDURE — C1713 ANCHOR/SCREW BN/BN,TIS/BN: HCPCS

## 2024-02-26 PROCEDURE — 97167 OT EVAL HIGH COMPLEX 60 MIN: CPT

## 2024-02-26 PROCEDURE — 1200000000 HC SEMI PRIVATE

## 2024-02-26 PROCEDURE — 6370000000 HC RX 637 (ALT 250 FOR IP): Performed by: HOSPITALIST

## 2024-02-26 PROCEDURE — 97116 GAIT TRAINING THERAPY: CPT

## 2024-02-26 PROCEDURE — 6370000000 HC RX 637 (ALT 250 FOR IP): Performed by: FAMILY MEDICINE

## 2024-02-26 PROCEDURE — 6370000000 HC RX 637 (ALT 250 FOR IP): Performed by: INTERNAL MEDICINE

## 2024-02-26 PROCEDURE — 0QU03JZ SUPPLEMENT LUMBAR VERTEBRA WITH SYNTHETIC SUBSTITUTE, PERCUTANEOUS APPROACH: ICD-10-PCS | Performed by: STUDENT IN AN ORGANIZED HEALTH CARE EDUCATION/TRAINING PROGRAM

## 2024-02-26 PROCEDURE — 97530 THERAPEUTIC ACTIVITIES: CPT

## 2024-02-26 PROCEDURE — 0QS03ZZ REPOSITION LUMBAR VERTEBRA, PERCUTANEOUS APPROACH: ICD-10-PCS | Performed by: STUDENT IN AN ORGANIZED HEALTH CARE EDUCATION/TRAINING PROGRAM

## 2024-02-26 PROCEDURE — C1894 INTRO/SHEATH, NON-LASER: HCPCS

## 2024-02-26 PROCEDURE — 2580000003 HC RX 258: Performed by: INTERNAL MEDICINE

## 2024-02-26 PROCEDURE — 97535 SELF CARE MNGMENT TRAINING: CPT

## 2024-02-26 PROCEDURE — 97162 PT EVAL MOD COMPLEX 30 MIN: CPT

## 2024-02-26 PROCEDURE — 2500000003 HC RX 250 WO HCPCS

## 2024-02-26 PROCEDURE — 6360000004 HC RX CONTRAST MEDICATION: Performed by: STUDENT IN AN ORGANIZED HEALTH CARE EDUCATION/TRAINING PROGRAM

## 2024-02-26 PROCEDURE — 99153 MOD SED SAME PHYS/QHP EA: CPT

## 2024-02-26 PROCEDURE — 22514 PERQ VERTEBRAL AUGMENTATION: CPT

## 2024-02-26 PROCEDURE — 6360000002 HC RX W HCPCS

## 2024-02-26 RX ORDER — AMLODIPINE BESYLATE 5 MG/1
5 TABLET ORAL ONCE
Status: COMPLETED | OUTPATIENT
Start: 2024-02-26 | End: 2024-02-26

## 2024-02-26 RX ORDER — CLONIDINE HYDROCHLORIDE 0.1 MG/1
0.1 TABLET ORAL 2 TIMES DAILY
Status: DISCONTINUED | OUTPATIENT
Start: 2024-02-26 | End: 2024-02-26

## 2024-02-26 RX ORDER — HYDRALAZINE HYDROCHLORIDE 20 MG/ML
5 INJECTION INTRAMUSCULAR; INTRAVENOUS EVERY 6 HOURS PRN
Status: DISCONTINUED | OUTPATIENT
Start: 2024-02-26 | End: 2024-02-28 | Stop reason: HOSPADM

## 2024-02-26 RX ORDER — CLONIDINE HYDROCHLORIDE 0.1 MG/1
0.1 TABLET ORAL ONCE
Status: COMPLETED | OUTPATIENT
Start: 2024-02-26 | End: 2024-02-26

## 2024-02-26 RX ORDER — IOPAMIDOL 408 MG/ML
40 INJECTION, SOLUTION INTRATHECAL
Status: COMPLETED | OUTPATIENT
Start: 2024-02-26 | End: 2024-02-26

## 2024-02-26 RX ADMIN — METHOCARBAMOL 250 MG: 500 TABLET ORAL at 20:36

## 2024-02-26 RX ADMIN — SODIUM CHLORIDE, PRESERVATIVE FREE 10 ML: 5 INJECTION INTRAVENOUS at 20:36

## 2024-02-26 RX ADMIN — MIRTAZAPINE 7.5 MG: 15 TABLET, FILM COATED ORAL at 20:36

## 2024-02-26 RX ADMIN — ACETAMINOPHEN 650 MG: 325 TABLET ORAL at 17:18

## 2024-02-26 RX ADMIN — LOSARTAN POTASSIUM 50 MG: 25 TABLET, FILM COATED ORAL at 07:47

## 2024-02-26 RX ADMIN — SODIUM CHLORIDE, PRESERVATIVE FREE 10 ML: 5 INJECTION INTRAVENOUS at 07:50

## 2024-02-26 RX ADMIN — CLONIDINE HYDROCHLORIDE 0.1 MG: 0.1 TABLET ORAL at 07:47

## 2024-02-26 RX ADMIN — OXYCODONE 5 MG: 5 TABLET ORAL at 20:36

## 2024-02-26 RX ADMIN — AMLODIPINE BESYLATE 5 MG: 5 TABLET ORAL at 18:07

## 2024-02-26 RX ADMIN — OXYCODONE 5 MG: 5 TABLET ORAL at 00:36

## 2024-02-26 RX ADMIN — METOPROLOL SUCCINATE 25 MG: 25 TABLET, EXTENDED RELEASE ORAL at 07:47

## 2024-02-26 RX ADMIN — ACETAMINOPHEN 650 MG: 325 TABLET ORAL at 06:48

## 2024-02-26 RX ADMIN — OXYCODONE 5 MG: 5 TABLET ORAL at 06:50

## 2024-02-26 RX ADMIN — ACETAMINOPHEN 650 MG: 325 TABLET ORAL at 12:31

## 2024-02-26 RX ADMIN — LOSARTAN POTASSIUM 50 MG: 25 TABLET, FILM COATED ORAL at 20:36

## 2024-02-26 RX ADMIN — ACETAMINOPHEN 650 MG: 325 TABLET ORAL at 00:36

## 2024-02-26 RX ADMIN — HYDRALAZINE HYDROCHLORIDE 5 MG: 20 INJECTION INTRAMUSCULAR; INTRAVENOUS at 12:58

## 2024-02-26 RX ADMIN — IOPAMIDOL 40 ML: 408 INJECTION, SOLUTION INTRATHECAL at 08:22

## 2024-02-26 ASSESSMENT — PAIN SCALES - GENERAL
PAINLEVEL_OUTOF10: 7
PAINLEVEL_OUTOF10: 7

## 2024-02-26 ASSESSMENT — PAIN SCALES - PAIN ASSESSMENT IN ADVANCED DEMENTIA (PAINAD)
NEGVOCALIZATION: 1
FACIALEXPRESSION: 0
CONSOLABILITY: 1
BREATHING: 0
BODYLANGUAGE: 1
TOTALSCORE: 3

## 2024-02-26 NOTE — PROGRESS NOTES
02/23/2024    PROTIME 14.6 11/19/2023        Assessment: 89 y.o. female with acute L1 compression fracture.    Following evaluation, patient is appropriate for Moderate Sedation Yes.    Plan: Will plan for L1 kyphoplasty with moderate sedation.    Bucky Edgar MD  02/26/24  8:19 AM

## 2024-02-26 NOTE — BRIEF OP NOTE
Interventional Radiology Post Procedure    Date: 2/26/2024    Physician: Bucky Edgar MD    Pre-op Diagnosis: L1 compression fracture    Post-op Diagnosis: same    Variation from Planned Procedure: None       Findings: L1 kyphoplasty without complication    Patient condition: Stable    Estimated Blood Loss: Minimal    Specimens:  None      Signed,  Bucky Edgar MD  9:09 AM  2/26/2024

## 2024-02-26 NOTE — PLAN OF CARE
Problem: Skin/Tissue Integrity  Goal: Absence of new skin breakdown  Description: 1.  Monitor for areas of redness and/or skin breakdown  2.  Assess vascular access sites hourly  3.  Every 4-6 hours minimum:  Change oxygen saturation probe site  4.  Every 4-6 hours:  If on nasal continuous positive airway pressure, respiratory therapy assess nares and determine need for appliance change or resting period.  Outcome: Progressing   On speciality mattress. Turns self in bed. Frequent repositioning encouraged.     Problem: Safety - Adult  Goal: Free from fall injury  Outcome: Progressing   All fall precautions in place. Bed locked and in lowest position with alarm on. Overbed table and personal belonings within reach. Call light within reach and patient instructed to use call light for assistance. Non-skid socks on.

## 2024-02-26 NOTE — CARE COORDINATION
Case Management Assessment  Initial Evaluation    Date/Time of Evaluation: 2/26/2024 3:45 PM  Assessment Completed by: Alyssa Gabriel    If patient is discharged prior to next notation, then this note serves as note for discharge by case management.    Patient Name: Bob Mann                   YOB: 1932  Diagnosis: Intractable low back pain [M54.59]  Closed compression fracture of body of L1 vertebra (HCC) [S32.010A]                   Date / Time: 2/23/2024 12:37 PM    Patient Admission Status: Inpatient   Readmission Risk (Low < 19, Mod (19-27), High > 27): Readmission Risk Score: 13.5    Current PCP: Tara Rg MD  PCP verified by CM? No    Chart Reviewed: Yes      History Provided by: Child/Family  Patient Orientation: Alert and Oriented, Person    Patient Cognition: Alert    Hospitalization in the last 30 days (Readmission):  No    If yes, Readmission Assessment in CM Navigator will be completed.    Advance Directives:      Code Status: Limited   Patient's Primary Decision Maker is: Legal Next of Kin      Discharge Planning:    Patient lives with: Other (Comment) (SNF) Type of Home: Skilled Nursing Facility  Primary Care Giver: Other (Comment) (Patient from [facility])  Patient Support Systems include: Children   Current Financial resources: Medicaid, Medicare  Current community resources: ECF/Home Care  Current services prior to admission: Skilled Nursing Facility            Current DME:              Type of Home Care services:  Skilled Therapy, OT, PT    ADLS  Prior functional level: Assistance with the following:, Bathing, Dressing, Toileting, Feeding, Cooking, Housework, Shopping, Mobility  Current functional level: Mobility, Shopping, Housework, Cooking, Feeding, Toileting, Dressing, Bathing, Assistance with the following:    PT AM-PAC: 17 /24  OT AM-PAC: 12 /24    Family can provide assistance at DC: Other (comment) (Son agreeable for patient to go to SNF)  Would you like Case

## 2024-02-27 LAB
ANION GAP SERPL CALCULATED.3IONS-SCNC: 14 MMOL/L (ref 3–16)
BACTERIA UR CULT: ABNORMAL
BUN SERPL-MCNC: 11 MG/DL (ref 7–20)
CALCIUM SERPL-MCNC: 9 MG/DL (ref 8.3–10.6)
CHLORIDE SERPL-SCNC: 104 MMOL/L (ref 99–110)
CO2 SERPL-SCNC: 24 MMOL/L (ref 21–32)
CREAT SERPL-MCNC: 0.6 MG/DL (ref 0.6–1.2)
DEPRECATED RDW RBC AUTO: 15.4 % (ref 12.4–15.4)
GFR SERPLBLD CREATININE-BSD FMLA CKD-EPI: >60 ML/MIN/{1.73_M2}
GLUCOSE SERPL-MCNC: 95 MG/DL (ref 70–99)
HCT VFR BLD AUTO: 40.1 % (ref 36–48)
HGB BLD-MCNC: 13.5 G/DL (ref 12–16)
MAGNESIUM SERPL-MCNC: 2.2 MG/DL (ref 1.8–2.4)
MCH RBC QN AUTO: 28.8 PG (ref 26–34)
MCHC RBC AUTO-ENTMCNC: 33.7 G/DL (ref 31–36)
MCV RBC AUTO: 85.7 FL (ref 80–100)
ORGANISM: ABNORMAL
PLATELET # BLD AUTO: 175 K/UL (ref 135–450)
PMV BLD AUTO: 7.7 FL (ref 5–10.5)
POTASSIUM SERPL-SCNC: 2.9 MMOL/L (ref 3.5–5.1)
POTASSIUM SERPL-SCNC: 3.9 MMOL/L (ref 3.5–5.1)
RBC # BLD AUTO: 4.68 M/UL (ref 4–5.2)
SODIUM SERPL-SCNC: 142 MMOL/L (ref 136–145)
WBC # BLD AUTO: 6.1 K/UL (ref 4–11)

## 2024-02-27 PROCEDURE — 80048 BASIC METABOLIC PNL TOTAL CA: CPT

## 2024-02-27 PROCEDURE — 84132 ASSAY OF SERUM POTASSIUM: CPT

## 2024-02-27 PROCEDURE — 6370000000 HC RX 637 (ALT 250 FOR IP): Performed by: INTERNAL MEDICINE

## 2024-02-27 PROCEDURE — 6360000002 HC RX W HCPCS: Performed by: FAMILY MEDICINE

## 2024-02-27 PROCEDURE — 2580000003 HC RX 258: Performed by: INTERNAL MEDICINE

## 2024-02-27 PROCEDURE — 85027 COMPLETE CBC AUTOMATED: CPT

## 2024-02-27 PROCEDURE — 1200000000 HC SEMI PRIVATE

## 2024-02-27 PROCEDURE — 6360000002 HC RX W HCPCS: Performed by: INTERNAL MEDICINE

## 2024-02-27 PROCEDURE — 6370000000 HC RX 637 (ALT 250 FOR IP): Performed by: HOSPITALIST

## 2024-02-27 PROCEDURE — 36415 COLL VENOUS BLD VENIPUNCTURE: CPT

## 2024-02-27 PROCEDURE — 83735 ASSAY OF MAGNESIUM: CPT

## 2024-02-27 RX ORDER — POTASSIUM CHLORIDE 20 MEQ/1
40 TABLET, EXTENDED RELEASE ORAL ONCE
Status: COMPLETED | OUTPATIENT
Start: 2024-02-27 | End: 2024-02-27

## 2024-02-27 RX ORDER — POTASSIUM CHLORIDE 7.45 MG/ML
10 INJECTION INTRAVENOUS
Status: COMPLETED | OUTPATIENT
Start: 2024-02-27 | End: 2024-02-27

## 2024-02-27 RX ADMIN — POTASSIUM CHLORIDE 40 MEQ: 1500 TABLET, EXTENDED RELEASE ORAL at 10:53

## 2024-02-27 RX ADMIN — OXYCODONE 5 MG: 5 TABLET ORAL at 22:15

## 2024-02-27 RX ADMIN — POTASSIUM CHLORIDE 10 MEQ: 10 INJECTION, SOLUTION INTRAVENOUS at 11:53

## 2024-02-27 RX ADMIN — MIRTAZAPINE 7.5 MG: 15 TABLET, FILM COATED ORAL at 20:07

## 2024-02-27 RX ADMIN — ACETAMINOPHEN 650 MG: 325 TABLET ORAL at 05:05

## 2024-02-27 RX ADMIN — POTASSIUM CHLORIDE 10 MEQ: 10 INJECTION, SOLUTION INTRAVENOUS at 14:13

## 2024-02-27 RX ADMIN — HYDRALAZINE HYDROCHLORIDE 5 MG: 20 INJECTION INTRAMUSCULAR; INTRAVENOUS at 00:31

## 2024-02-27 RX ADMIN — LOSARTAN POTASSIUM 50 MG: 25 TABLET, FILM COATED ORAL at 20:07

## 2024-02-27 RX ADMIN — ACETAMINOPHEN 650 MG: 325 TABLET ORAL at 00:28

## 2024-02-27 RX ADMIN — ACETAMINOPHEN 650 MG: 325 TABLET ORAL at 16:52

## 2024-02-27 RX ADMIN — SODIUM CHLORIDE, PRESERVATIVE FREE 10 ML: 5 INJECTION INTRAVENOUS at 08:34

## 2024-02-27 RX ADMIN — SODIUM CHLORIDE, PRESERVATIVE FREE 5 ML: 5 INJECTION INTRAVENOUS at 20:50

## 2024-02-27 RX ADMIN — HYDRALAZINE HYDROCHLORIDE 5 MG: 20 INJECTION INTRAMUSCULAR; INTRAVENOUS at 05:53

## 2024-02-27 RX ADMIN — POTASSIUM CHLORIDE 10 MEQ: 10 INJECTION, SOLUTION INTRAVENOUS at 10:52

## 2024-02-27 RX ADMIN — POTASSIUM CHLORIDE 10 MEQ: 10 INJECTION, SOLUTION INTRAVENOUS at 13:09

## 2024-02-27 RX ADMIN — ACETAMINOPHEN 650 MG: 325 TABLET ORAL at 10:53

## 2024-02-27 RX ADMIN — LOSARTAN POTASSIUM 50 MG: 25 TABLET, FILM COATED ORAL at 08:31

## 2024-02-27 RX ADMIN — OXYCODONE 5 MG: 5 TABLET ORAL at 00:32

## 2024-02-27 RX ADMIN — TAMSULOSIN HYDROCHLORIDE 0.4 MG: 0.4 CAPSULE ORAL at 08:31

## 2024-02-27 RX ADMIN — METOPROLOL SUCCINATE 25 MG: 25 TABLET, EXTENDED RELEASE ORAL at 08:31

## 2024-02-27 ASSESSMENT — PAIN SCALES - GENERAL
PAINLEVEL_OUTOF10: 3
PAINLEVEL_OUTOF10: 7

## 2024-02-27 NOTE — PLAN OF CARE
Problem: Skin/Tissue Integrity  Goal: Absence of new skin breakdown  Description: 1.  Monitor for areas of redness and/or skin breakdown  2.  Assess vascular access sites hourly  3.  Every 4-6 hours minimum:  Change oxygen saturation probe site  4.  Every 4-6 hours:  If on nasal continuous positive airway pressure, respiratory therapy assess nares and determine need for appliance change or resting period.  Outcome: Progressing     Problem: Safety - Adult  Goal: Free from fall injury  Outcome: Progressing   Standard safety measures in place. AVASIS camera in place. Educated on importance of calling staff before exiting bed.

## 2024-02-27 NOTE — CARE COORDINATION
Patient with HX hip replacement and recent fall. Patient admitted with L1 compression fracture. Kyphoplasty POD 1. UTI, will need ABX.  Plan is for patient to return to [facility] on SNF side for rehab and then back to LTC. Call placed to [facility], waiting for return call. Son is on board with discharge plan. Will cont to follow for CM needs. D/C likely tomorrow.    Electronically signed by Alyssa Gabriel on 2/27/2024 at 1:48 PM

## 2024-02-27 NOTE — DISCHARGE INSTR - COC
Continuity of Care Form    Patient Name: Bob Mann   :  3/24/1932  MRN:  [id number]    Admit date:  2024  Discharge date:      Code Status Order: Limited   Advance Directives:     Admitting Physician:  Dwayne Chahal MD  PCP: Tara gR MD    Discharging Nurse: [facility] Unit/Room#: 5504/5504-01  Discharging Unit Phone Number: [phone]    Emergency Contact:   Extended Emergency Contact Information  Primary Emergency Contact: Jamison Lowery  Home Phone: [phone]  Mobile Phone: [phone]  Relation: Child   needed? No    Past Surgical History:  Past Surgical History:   Procedure Laterality Date    HIP SURGERY Left 2023    LEFT HIP HEMIARTHROPLASTY ANTERIOR APPROACH performed by Doug Byrne MD at [facility] OR    HYSTERECTOMY (CERVIX STATUS UNKNOWN)      IR KYPHOPLASTY LUMBAR FIRST LEVEL  2024    IR KYPHOPLASTY LUMBAR FIRST LEVEL 2024 [facility] SPECIAL PROCEDURES       Immunization History:     There is no immunization history on file for this patient.    Active Problems:  Patient Active Problem List   Diagnosis Code    Paroxysmal atrial fibrillation ([facility]) I48.0    Primary hypertension I10    Precordial pain R07.2    Displaced intertrochanteric fracture of left femur, initial encounter for closed fracture ([facility]) S72.142A    Closed compression fracture of body of L1 vertebra ([facility]) S32.010A    Dehydration E86.0    Intractable low back pain M54.59       Isolation/Infection:   Isolation            No Isolation          Patient Infection Status       None to display            Nurse Assessment:  Last Vital Signs: BP (!) 147/89   Pulse 90   Temp 97.4 °F (36.3 °C) (Axillary)   Resp 16   Ht 1.626 m (5' 4.02\")   Wt 43.4 kg (95 lb 10.9 oz)   SpO2 95%   BMI 16.42 kg/m²     Last documented pain score (0-10 scale): Pain Level: 3  Last Weight:   Wt Readings from Last 1 Encounters:   24 43.4 kg (95 lb 10.9 oz)     Mental Status:

## 2024-02-28 VITALS
HEART RATE: 86 BPM | SYSTOLIC BLOOD PRESSURE: 180 MMHG | DIASTOLIC BLOOD PRESSURE: 70 MMHG | HEIGHT: 64 IN | RESPIRATION RATE: 18 BRPM | TEMPERATURE: 98.3 F | WEIGHT: 95.68 LBS | OXYGEN SATURATION: 96 % | BODY MASS INDEX: 16.33 KG/M2

## 2024-02-28 LAB — POTASSIUM SERPL-SCNC: 3.4 MMOL/L (ref 3.5–5.1)

## 2024-02-28 PROCEDURE — 84132 ASSAY OF SERUM POTASSIUM: CPT

## 2024-02-28 PROCEDURE — 36415 COLL VENOUS BLD VENIPUNCTURE: CPT

## 2024-02-28 PROCEDURE — 6370000000 HC RX 637 (ALT 250 FOR IP): Performed by: HOSPITALIST

## 2024-02-28 PROCEDURE — 6370000000 HC RX 637 (ALT 250 FOR IP): Performed by: INTERNAL MEDICINE

## 2024-02-28 RX ORDER — OXYCODONE HYDROCHLORIDE 5 MG/1
2.5 TABLET ORAL EVERY 4 HOURS PRN
Qty: 10 TABLET | Refills: 0 | Status: SHIPPED | OUTPATIENT
Start: 2024-02-28 | End: 2024-03-02

## 2024-02-28 RX ADMIN — OXYCODONE 5 MG: 5 TABLET ORAL at 14:55

## 2024-02-28 RX ADMIN — ACETAMINOPHEN 650 MG: 325 TABLET ORAL at 05:41

## 2024-02-28 RX ADMIN — METOPROLOL SUCCINATE 25 MG: 25 TABLET, EXTENDED RELEASE ORAL at 08:59

## 2024-02-28 RX ADMIN — LOSARTAN POTASSIUM 50 MG: 25 TABLET, FILM COATED ORAL at 08:59

## 2024-02-28 RX ADMIN — ACETAMINOPHEN 650 MG: 325 TABLET ORAL at 14:41

## 2024-02-28 RX ADMIN — TAMSULOSIN HYDROCHLORIDE 0.4 MG: 0.4 CAPSULE ORAL at 08:59

## 2024-02-28 RX ADMIN — OXYCODONE 5 MG: 5 TABLET ORAL at 08:59

## 2024-02-28 ASSESSMENT — PAIN DESCRIPTION - FREQUENCY
FREQUENCY: CONTINUOUS
FREQUENCY: CONTINUOUS

## 2024-02-28 ASSESSMENT — PAIN DESCRIPTION - ORIENTATION
ORIENTATION: OTHER (COMMENT)
ORIENTATION: OTHER (COMMENT)

## 2024-02-28 ASSESSMENT — PAIN DESCRIPTION - PAIN TYPE
TYPE: SURGICAL PAIN
TYPE: SURGICAL PAIN

## 2024-02-28 ASSESSMENT — PAIN DESCRIPTION - LOCATION
LOCATION: BACK
LOCATION: BACK

## 2024-02-28 ASSESSMENT — PAIN - FUNCTIONAL ASSESSMENT
PAIN_FUNCTIONAL_ASSESSMENT: ACTIVITIES ARE NOT PREVENTED
PAIN_FUNCTIONAL_ASSESSMENT: ACTIVITIES ARE NOT PREVENTED

## 2024-02-28 ASSESSMENT — PAIN SCALES - GENERAL
PAINLEVEL_OUTOF10: 0
PAINLEVEL_OUTOF10: 7
PAINLEVEL_OUTOF10: 0
PAINLEVEL_OUTOF10: 7

## 2024-02-28 ASSESSMENT — PAIN DESCRIPTION - DESCRIPTORS
DESCRIPTORS: PATIENT UNABLE TO DESCRIBE
DESCRIPTORS: PATIENT UNABLE TO DESCRIBE

## 2024-02-28 ASSESSMENT — PAIN DESCRIPTION - ONSET
ONSET: UNABLE TO COMMUNICATE
ONSET: UNABLE TO COMMUNICATE

## 2024-02-28 NOTE — CARE COORDINATION
Case Management Assessment            Discharge Note                    Date / Time of Note: 2/28/2024 11:46 AM                  Discharge Note Completed by: Alyssa     Patient Name: Bob Mann   YOB: 1932  Diagnosis: Intractable low back pain [M54.59]  Closed compression fracture of body of L1 vertebra (HCC) [S32.010A]   Date / Time: 2/23/2024 12:37 PM    Current PCP: Tara Rg MD  Clinic patient: No    Hospitalization in the last 30 days: No       Advance Directives:  Code Status: Limited  [place] DNR form completed and on chart: Limited    Financial:  Payor: [email or web address] I-SNP / Plan: [employer] I-SNP / Product Type: *No Product type* /      Pharmacy:    [id number] [place], [place] - [address] - P [phone] - F [phone] [address] [facility] [address]  Phone: [phone] Fax: [phone]      Assistance purchasing medications?: Potential Assistance Purchasing Medications: No  Assistance provided by Case Management: None at this time    Does patient want to participate in local refill/ meds to beds program?: No    Meds To Beds General Rules:  1. Can ONLY be done Monday- Friday between 8:30am-5pm  2. Prescription(s) must be in pharmacy by 3pm to be filled same day  3.Copy of patient's insurance/ prescription drug card and patient face sheet must be sent along with the prescription(s)  4. Cost of Rx cannot be added to hospital bill. If financial assistance is needed, please contact unit  or ;  or  CANNOT provide pharmacy voucher for patients co-pays  5. Patients can then  the prescription on their way out of the hospital at discharge, or pharmacy can deliver to the bedside if staff is available. (payment due at time of pick-up or delivery - cash, check, or card accepted)     Able to afford home medications/ co-pay costs: Yes    ADLS:  Current PT  AM-PAC Score: 17 /24  Current OT AM-PAC Score: 12 /24      DISCHARGE Disposition: Skilled Nursing Facility (SNF): [facility] Phone: [phone] Fax: [phone]    LOC at discharge: Skilled  [place] Completed: Yes    Notification completed in HENS/PAS?:  Not Applicable    IMM Completed:   Yes, Case management has presented and reviewed IMM letter #2 to the patient and/or family/ POA. Patient and/or family/POA verbalized understanding of their medicare rights and appeal process if needed. Patient and/or family/POA has signed and placed today's date (2/28) and time (.) on letter #2 on the the appropriate lines. Patient and/or family/POA, provided copy of signed letter and they are aware that this original copy of IMM letter #2 is available prior to discharge from the paper chart on the unit.  Electronic documentation has been entered into epic for IMM letter #2 and original paper copy has been added to the paper chart at the nurses station. To be documented by CM assistant.     Presentation of IMM to be given within 4 hours of discharge. Patient understands and agrees with discharge plan.       Transportation:  Transportation PLAN for discharge: EMS transportation   Mode of Transport: Ambulance stretcher - BLS  Reason for medical transport: Confused due to MRDD and requires ambulance transport due to celia weakness and requiring supervision.  Name of Transport Company:  [facility] [address]   Phone: [phone]  Time of Transport: 1700    Transport form completed: Yes    Additional CM Notes: Patient cleared for discharge.  Transportation set up and family aware and agreeable.  Facility notified.    COVID Result:  No results found for: \"COVID19\"    The Plan for Transition of Care is related to the following treatment goals of Intractable low back pain [M54.59]  Closed compression fracture of body of L1 vertebra (HCC) [S32.010A]    The Patient and/or patient representative Bob and her family were provided with a choice of

## 2024-02-28 NOTE — PLAN OF CARE
Problem: Discharge Planning  Goal: Discharge to home or other facility with appropriate resources  2/27/2024 1925 by Maribel Flores RN  Outcome: Progressing  Discharge to home or other facility with appropriate resources: Identify barriers to discharge with patient and caregiver     Problem: Pain  Goal: Verbalizes/displays adequate comfort level or baseline comfort level  2/27/2024 1925 by Maribel Flores, RN  Outcome: Progressing  FACES scale used to monitor pain. Frequent reassessment preformed throughout shift. Numbers scale unable to be used d/t language barrier.     Problem: Safety - Adult  Goal: Free from fall injury  2/27/2024 1925 by Maribel Flores, RN  Outcome: Progressing  All fall precautions in place. Bed locked and in lowest position with alarm on. Overbed table and personal belonings within reach. Call light within reach and patient instructed to use call light for assistance. Non-skid socks on.

## 2024-02-28 NOTE — PLAN OF CARE
Problem: Discharge Planning  Goal: Discharge to home or other facility with appropriate resources  Outcome: Progressing   Patient is dc today  Problem: Pain  Goal: Verbalizes/displays adequate comfort level or baseline comfort level  Outcome: Progressing  Pt endorsing pain to back per FLACC scale. Being treated with PRN pain medication, rest, and frequent repositioning with pillow support for comfort and pressure relief. Pt reports some relief from pain with above interventions.

## 2024-02-28 NOTE — DISCHARGE SUMMARY
V2.0  Discharge Summary    Name:  Bob Mann /Age/Sex: 3/24/1932 (89 y.o. female)   Admit Date: 2024  Discharge Date: 24    MRN & CSN:  [id number] & [id number] Encounter Date and Time 24 1:06 PM EST    Attending:  Kendra Gutierrez MD Discharging Provider: Kendra Gutierrez MD       Hospital Course:     Brief HPI: Bob Mann is a 89 y.o. female with pmh of multiple falls, HTN, Afib on AC with apixaban  - speaks Mandarin, son acted as   -Office in the last couple of months she has had more memory loss, especially since she had the hip surgery in 2023  - presented after a fall that happened one week back on , probably fell in the restroom, unwitnessed fall suspected mechanical, has been having increased pain in the lower back and bilateral hips. Patient is having pain on any kind of movement. Patient has x ray done at the facility with no abnormalities noted  She is normally able to ambulate with a walker however due to the pain has been unable to since this event.      CT scan of the lumbar spine and pelvis performed showing acute L1 compression fracture. Interventional radiology consulted and they plan for kyphoplasty under moderate sedation.     Brief Problem Based Course:   89-year-old female with a history of hypertension atrial fibrillation on Eliquis who only speaks Mandarin presented at with fall resulting in L1 compression fracture.  Patient was admitted in the hospital patient did had kyphoplasty done on .  Pain control seems to be adequate.    Patient also did had critical hypokalemia during her stay which was replaced.  Patient is on p.o. potassium on discharge which was continued.    Patient did had abnormal UA patient was asymptomatic urine culture did grow Klebsiella but as patient was not symptomatic we did not treat that.    Atrial fibrillation Eliquis was held during hospitalization but will be resumed on discharge.    Will also  placed in the prone position. The back was prepped and draped in the usual sterile fashion as described above. 1% lidocaine was used anesthetize the skin overlying the pedicle. A Kyphon express coaxial needle was advanced through the right L1 pedicle under constant fluoroscopic guidance. Care was taken to ensure the access needle did not traverse the medial or inferior margins of the pedicle. The coaxial needle was advanced into the posterior aspect of the vertebral body. The procedure was then performed in identical manner on the contralateral side. Inflatable bone balloon were then advanced through the working cannula and towards the anterior vertebral body. The balloon was insufflated. Partial height restoration was obtained. Bone cement was then prepared in the standard manner, and then administered through the working cannulas into the vertebral body under continuous fluoroscopic observation until adequate application was achieved. The working cannulas were then removed and local hemostasis was obtained using manual compression. The patient tolerated the procedure well and was transferred to the angiography suite in stable condition. DAP: 6002 mGycm2     1. Successful L1 kyphoplasty without complication.     CT CERVICAL SPINE WO CONTRAST    Result Date: 2/23/2024  CT CERVICAL SPINE WO CONTRAST HISTORY: 89 years Female; \"fall at home, on apixaban\" TECHNIQUE: CT cervical spine without contrast per standard protocol. Multiplanar reformatted images are provided for review. Up-to-date CT equipment and radiation dose reduction techniques were employed. COMPARISON: CT cervical spine 12/2/2023 FINDINGS: Vertebral body heights are preserved. No acute fracture or traumatic subluxation. No prevertebral soft tissue swelling. Multilevel degenerative changes of the spine in the form of disc space height loss, marginal osteophytosis, facet and uncovertebral hypertrophy. Mild degenerative retrolisthesis of C3 on C4 and C4 and

## 2024-03-06 NOTE — PROGRESS NOTES
Hospitalist Progress Note      PCP: Tara Rg MD    Date of Admission: 2/23/2024    LOS: 1    Chief Complaint:   Chief Complaint   Patient presents with    Fall    Pain     Patient comes to the ED today for a fall that happened last Friday. Patient has been having increased pain in the lower back and bilateral hips. Patient is having pain on any kind of movement. Patient has x ray done at the facility with no abnormalities noted.        Case Summary:   89-year-old lady with history of atrial fibrillation, dementia who had a fall sustaining L1 compression fracture with intractable low back pain admitted with plans for kyphoplasty on 2/26/2024    Active Hospital Problems    Diagnosis Date Noted    Closed compression fracture of body of L1 vertebra (HCC) [S32.010A] 02/23/2024    Dehydration [E86.0] 02/23/2024    Intractable low back pain [M54.59] 02/23/2024         Principal Problem:    Closed compression fracture of body of L1 vertebra with Intractable low back pain: Patient was seen by Interventional radiology with plans for kyphoplasty on Monday  - Continue pain management  -PT OT    Active Problems:    Dehydration: Continue IV hydration    Essential hypertension: Stable on losartan, metoprolol         Medications:  Reviewed  Infusion Medications    sodium chloride      lactated ringers IV soln 75 mL/hr at 02/23/24 1855     Scheduled Medications    losartan  25 mg Oral BID    metoprolol succinate  25 mg Oral Daily    mirtazapine  7.5 mg Oral Nightly    oxyBUTYnin  5 mg Oral Nightly    tamsulosin  0.4 mg Oral Daily    sodium chloride flush  5-40 mL IntraVENous 2 times per day    acetaminophen  650 mg Oral Q6H     PRN Meds: methocarbamol, sodium chloride flush, sodium chloride, ondansetron **OR** ondansetron, polyethylene glycol, acetaminophen **OR** acetaminophen, HYDROmorphone **OR** HYDROmorphone, oxyCODONE **OR** oxyCODONE      DVT Prophylaxis: Subcut heparin  Diet: ADULT DIET; Regular  Code Status: 
      Hospitalist Progress Note      PCP: Tara Rg MD    Date of Admission: 2/23/2024    LOS: 3    Chief Complaint:   Chief Complaint   Patient presents with    Fall    Pain     Patient comes to the ED today for a fall that happened last Friday. Patient has been having increased pain in the lower back and bilateral hips. Patient is having pain on any kind of movement. Patient has x ray done at the facility with no abnormalities noted.        Case Summary:   89-year-old lady with history of atrial fibrillation, dementia who had a fall sustaining L1 compression fracture with intractable low back pain admitted with plans for kyphoplasty on 2/26/2024    Active Hospital Problems    Diagnosis Date Noted    Closed compression fracture of body of L1 vertebra (HCC) [S32.010A] 02/23/2024    Dehydration [E86.0] 02/23/2024    Intractable low back pain [M54.59] 02/23/2024         Principal Problem:    Closed compression fracture of body of L1 vertebra with Intractable low back pain  -S/P kyphoplasty today  - Continue pain management  -PT OT    Abnormal UA -asymptomatic. Son does not think she is confused.  monitor off antibiotics for now     Active Problems:    Dehydration: Continue IV hydration    Essential hypertension: Stable on losartan, metoprolol    Afib - metoprolol . Restart Eliquis when ok with IR    Limited code  POA: Papo Swift      Medications:  Reviewed  Infusion Medications    sodium chloride       Scheduled Medications    losartan  50 mg Oral BID    metoprolol succinate  25 mg Oral Daily    mirtazapine  7.5 mg Oral Nightly    tamsulosin  0.4 mg Oral Daily    sodium chloride flush  5-40 mL IntraVENous 2 times per day    acetaminophen  650 mg Oral Q6H     PRN Meds: hydrALAZINE, methocarbamol, sodium chloride flush, sodium chloride, ondansetron **OR** ondansetron, polyethylene glycol, acetaminophen **OR** acetaminophen, oxyCODONE **OR** oxyCODONE      DVT Prophylaxis: Subcut heparin  Diet: ADULT ORAL NUTRITION 
      Hospitalist Progress Note      PCP: Tara Rg MD    Date of Admission: 2/23/2024    LOS: 4    Chief Complaint:   Chief Complaint   Patient presents with    Fall    Pain     Patient comes to the ED today for a fall that happened last Friday. Patient has been having increased pain in the lower back and bilateral hips. Patient is having pain on any kind of movement. Patient has x ray done at the facility with no abnormalities noted.        Case Summary:   89-year-old lady with history of atrial fibrillation, dementia who had a fall sustaining L1 compression fracture with intractable low back pain admitted with plans for kyphoplasty on 2/26/2024    Active Hospital Problems    Diagnosis Date Noted    Closed compression fracture of body of L1 vertebra (HCC) [S32.010A] 02/23/2024    Dehydration [E86.0] 02/23/2024    Intractable low back pain [M54.59] 02/23/2024         Principal Problem:  Critical hypokalemia with a potassium of 2.9  IV replacement and p.o. replacement  Recheck potassium after replacement    Urine culture positive for Klebsiella pneumonia  Called son to talk to the patient about any urinary symptoms and if patient is having any symptoms we will consider starting antibiotics  WBC count is normal  Abdominal palpation does not show any visible discomfort          Closed compression fracture of body of L1 vertebra with Intractable low back pain  -S/P kyphoplasty 2/26/2024  - Continue pain management  -PT OT    Abnormal UA -asymptomatic. Son does not think she is confused.  monitor off antibiotics for now     Active Problems:    Dehydration: Continue IV hydration    Essential hypertension: Stable on losartan, metoprolol    Afib - metoprolol . Restart Eliquis when ok with IR    Limited code  POA: Papo Swift      Medications:  Reviewed  Infusion Medications    sodium chloride       Scheduled Medications    potassium chloride  10 mEq IntraVENous Q1H    losartan  50 mg Oral BID    metoprolol succinate  25 
      Hospitalist Progress Note      PCP: Tara gR MD    Date of Admission: 2/23/2024    LOS: 2    Chief Complaint:   Chief Complaint   Patient presents with    Fall    Pain     Patient comes to the ED today for a fall that happened last Friday. Patient has been having increased pain in the lower back and bilateral hips. Patient is having pain on any kind of movement. Patient has x ray done at the facility with no abnormalities noted.        Case Summary:   89-year-old lady with history of atrial fibrillation, dementia who had a fall sustaining L1 compression fracture with intractable low back pain admitted with plans for kyphoplasty on 2/26/2024    Active Hospital Problems    Diagnosis Date Noted    Closed compression fracture of body of L1 vertebra (HCC) [S32.010A] 02/23/2024    Dehydration [E86.0] 02/23/2024    Intractable low back pain [M54.59] 02/23/2024         Principal Problem:    Closed compression fracture of body of L1 vertebra with Intractable low back pain: Patient was seen by Interventional radiology with plans for kyphoplasty tomorrow  - Continue pain management  -PT OT    Abnormal UA - monitor off antibiotics for now     Active Problems:    Dehydration: Continue IV hydration    Essential hypertension: Stable on losartan, metoprolol         Medications:  Reviewed  Infusion Medications    sodium chloride       Scheduled Medications    losartan  50 mg Oral BID    metoprolol succinate  25 mg Oral Daily    mirtazapine  7.5 mg Oral Nightly    tamsulosin  0.4 mg Oral Daily    sodium chloride flush  5-40 mL IntraVENous 2 times per day    acetaminophen  650 mg Oral Q6H     PRN Meds: methocarbamol, sodium chloride flush, sodium chloride, ondansetron **OR** ondansetron, polyethylene glycol, acetaminophen **OR** acetaminophen, HYDROmorphone **OR** HYDROmorphone, oxyCODONE **OR** oxyCODONE      DVT Prophylaxis: Subcut heparin  Diet: ADULT DIET; Regular  ADULT ORAL NUTRITION SUPPLEMENT; Lunch, Dinner; 
  Comprehensive Nutrition Assessment    RECOMMENDATIONS:  PO Diet: Soft & Bite sized per SLP  ONS: high kcal/high protein supplement BID  Nutrition Education: Education not indicated     NUTRITION ASSESSMENT:   Nutritional summary & status: + screen for low BMI: Patient speaks a dialect of Mandarin that is not available on the interpretation device. Pt's son who can translate is not in room at time of visit. PO intake of meals is <25% consistently during admit. Noted pt on remeron. ONS started day after admission, unsure if pt is drinking Ensures, pt gives 'thumbs up' to them in room. Per EMR, severe 18% wt loss x 2 months. No significant muscle or fat wasting noted (age-related). BGs elevated 130-140s- will monitor need for CHO restriction if intakes improve.     Admission // PMH: Fractue of L1 vertebra // dementia, HTN    MALNUTRITION ASSESSMENT  Context of Malnutrition: Acute Illness   Malnutrition Status: At risk for malnutrition (Comment)  Findings of the 6 clinical characteristics of malnutrition (Minimum of 2 out of 6 clinical characteristics is required to make the diagnosis of moderate or severe Protein Calorie Malnutrition based on AND/ASPEN Guidelines):  Energy Intake:  Mild decrease in energy intake (Comment)  Weight Loss:  Unable to assess     Body Fat Loss:  No significant body fat loss     Muscle Mass Loss:  No significant muscle mass loss        NUTRITION DIAGNOSIS     related to inadequate protein-energy intake as evidenced by intake 0-25%    Nutrition Monitoring and Evaluation:   Food/Nutrient Intake Outcomes:  Supplement Intake, Food and Nutrient Intake  Physical Signs/Symptoms Outcomes:  Biochemical Data, Nutrition Focused Physical Findings, Weight     OBJECTIVE DATA: Significant to nutrition assessment  Nutrition Related Findings: K 3.2, BM 2/24  Wounds: None  Nutrition Goals: PO intake 50% or greater, by next RD assessment     CURRENT NUTRITION THERAPIES  ADULT ORAL NUTRITION SUPPLEMENT; Lunch, 
  Physician Progress Note      PATIENT:               LUCIO RACHEL  [facility] #:                  [id number]  :                       3/24/1932  ADMIT DATE:       2024 12:37 PM  DISCH DATE:        2024 6:21 PM  RESPONDING  PROVIDER #:        Kendra MAYS MD          QUERY TEXT:    Patient admitted with acute L1 compression fracture. Noted to have BMI of 16.   If possible, please document in progress notes and discharge summary if you   are evaluating and /or treating any of the following:    The medical record reflects the following:  Risk Factors: 89 yo mandarin speaking w/ dementia, dehydration from a SNF  Clinical Indicators: Per RD : Current Height: 162.6 cm (5' 4.02\")  Current Weight - Scale: 43.4 kg (95 lb 10.9 oz) BMI 16. PO intake of meals is   <25% consistently during admit. Per EMR, severe 18% wt loss x 2 months.  Treatment: RD monitoring, ONS    ASPEN Criteria:    https://aspenjournals.onlinelibrary.shore.com/doi/full/10.1177/126437441731915  5  Options provided:  -- Underweight with BMI 16  -- Other - I will add my own diagnosis  -- Disagree - Not applicable / Not valid  -- Disagree - Clinically unable to determine / Unknown  -- Refer to Clinical Documentation Reviewer    PROVIDER RESPONSE TEXT:    This patient is underweight with a BMI 16.    Query created by: Melba Solares on 3/1/2024 11:52 AM      Electronically signed by:  Kendra MAYS MD 3/6/2024 5:13 PM          
/84 map 124 (R), 184/89 map 121 (L), HR 91 regular (no tele). c/o back pain; will give tylenol and oxy. Dr sunil Mcghee notified via perfect serve  
/90. MD notified. PRN hydralazine given per order. Pt refusing tele at this time. MD notified. No new orders at this time. Plan of care continues.   
4 Eyes Skin Assessment     NAME:  Bob Mann  YOB: 1932  MEDICAL RECORD NUMBER:  [id number]    The patient is being assessed for  Admission    I agree that at least one RN has performed a thorough Head to Toe Skin Assessment on the patient. ALL assessment sites listed below have been assessed.      Areas assessed by both nurses:    Head, Face, Ears, Shoulders, Back, Chest, Arms, Elbows, Hands, Sacrum. Buttock, Coccyx, Ischium, Legs. Feet and Heels, and Under Medical Devices         Does the Patient have a Wound? No noted wound(s)       Malachi Prevention initiated by RN: Yes  Wound Care Orders initiated by RN: No    Pressure Injury (Stage 3,4, Unstageable, DTI, NWPT, and Complex wounds) if present, place Wound referral order by RN under : Yes  -nonblanchable Stage 1 wound to   -nonblanchable redness to bilat heels  -dry flaky skin head to toe  New Ostomies, if present place, Ostomy referral order under : No     Nurse 1 eSignature: Electronically signed by Nick Borges RN on 2/23/24 at 6:45 PM EST    **SHARE this note so that the co-signing nurse can place an eSignature**    Nurse 2 eSignature: Electronically signed by Rajwinder Maza RN on 2/23/24 at 7:00 PM EST    
4 Eyes Skin Assessment     NAME:  Bob Mann  YOB: 1932  MEDICAL RECORD NUMBER:  [id number]    The patient is being assessed for  Cath Lab Post-Op  Pt retuned to floor from kyphoplasty    I agree that at least one RN has performed a thorough Head to Toe Skin Assessment on the patient. ALL assessment sites listed below have been assessed.      Areas assessed by both nurses:    Head, Face, Ears, Shoulders, Back, Chest, Arms, Elbows, Hands, Sacrum. Buttock, Coccyx, Ischium, Legs. Feet and Heels, and Under Medical Devices         Does the Patient have a Wound? No noted wound(s)  Nonblanchable redness to sacrum/coccyx  Nonblanchable redness to bilateral heels  Kyphoplasty sites (2) to lumbar back  Malachi Prevention initiated by RN: Yes  Wound Care Orders initiated by RN: No    Pressure Injury (Stage 3,4, Unstageable, DTI, NWPT, and Complex wounds) if present, place Wound referral order by RN under : No    New Ostomies, if present place, Ostomy referral order under : No     Nurse 1 eSignature: Electronically signed by Elisa Saul RN on 2/26/24 at 9:55 AM EST    **SHARE this note so that the co-signing nurse can place an eSignature**    Nurse 2 eSignature: Electronically signed by Valencia Ramsey RN on 2/26/24 at 10:25 AM EST   
Alert, oriented to self. Difficult to assess due to language barrier. Son states she does not make sense and speaks \"gibberish\". Pt endorses pain via FLACC scale and pope-baker faces when ambulating and standing up or sitting down. Medicated with PRN robaxin and oxycodone. Pt resting comfortably at this time. Elevated BP this shift, no treatment so far, monitor per NP. Otherwise VSS on room air. Regular diet, takes pills crushed in applesauce. Voids via bathroom privileges. Upx1 contact guard with GB. Pt can be impulsive at times. Standard safety measures and fall precautions in place. WCTM.    
Called report to JERSON at [facility]  
Handoff given to Lon ARTHUR.  
IR Progress Note:    Chief Complaint: L1 compression fracture     24 hour Interval History: Patient underwent successful L1 kyphoplasty yesterday in IR with Dr Edgar. Patient's pain appears to have improved per son and bedside nursing staff, as patient speaks dialect of mandarin that interpreting services does not speak. Patient able to work with PT/OT yesterday post procedure and get out of bed to chair. No bleeding at procedure site. Patient remains with VSS on RA.     VITAL SIGNS   /65   Pulse 84   Temp 97.3 °F (36.3 °C) (Temporal)   Resp 14   Ht 1.626 m (5' 4.02\")   Wt 43.4 kg (95 lb 10.9 oz)   SpO2 97%   BMI 16.42 kg/m²          MEDICATIONS:      potassium chloride  10 mEq IntraVENous Q1H    potassium chloride  40 mEq Oral Once    losartan  50 mg Oral BID    metoprolol succinate  25 mg Oral Daily    mirtazapine  7.5 mg Oral Nightly    tamsulosin  0.4 mg Oral Daily    sodium chloride flush  5-40 mL IntraVENous 2 times per day    acetaminophen  650 mg Oral Q6H         DATA REVIEW:   Labs:    CBC:  Recent Labs     02/27/24  0848   WBC 6.1   RBC 4.68   HGB 13.5   HCT 40.1   MCV 85.7   RDW 15.4        CHEMISTRIES:  Recent Labs     02/27/24  0848      K 2.9*      CO2 24   BUN 11   CREATININE 0.6   GLUCOSE 95   MG 2.20       ASSESSMENT:     Neuro: awake, alert   CV: regular rate, rhythm   Pulm:  normal work of breathing  Abd:  soft, non-tender  : no roberts in place   PV: warm, no edema       PLAN:   Okay for discharge per IR.  Management per primary team.    Missy Garcia, APRN - CNP  Please feel free to call [phone] with any questions or concerns.     
Kyphoplasty of L1 planned for Monday given that patient took eliquis today.    Bucky Edgar MD  4:25 PM  2/23/2024    
Patient admitted to room 5519. Report received from sbar. BP elevated, see flowsheet, on room air. Patient oriented to room and call light. Rights and responsibilities provided to patient, and welcome packet provided to patient. 4 eyes skin assessment completed with 2nd RN.  All fall precautions in place.  
Patient is alert and oriented to self. Patient is unable to verbalize pain or discomfort. Patient noted to be grimacing and splinting abdomen earlier in the shift. Also reported pain to back. Pain medications administered per MAR. Patient is tolerating PO diet. Encouraged intake. Tolerating ambulation x2 contact-guard assist. Voiding via bathroom privileges. Patient updated on plan of care. Fall and safety precautions in place, call light within reach.    
Physical Therapy  Facility/Department: [facility] ORTHO/NEURO  Physical Therapy Initial Assessment/Treatment/Discharge Summary     Name: Bob Mann  : 3/24/1932  MRN: [id number]  Date of Service: 2024    Discharge Recommendations:  Long Term Care with PT   PT Equipment Recommendations  Equipment Needed: No      Patient Diagnosis(es): The encounter diagnosis was Closed compression fracture of body of L1 vertebra (HCC).  Past Medical History:  has a past medical history of Atrial fibrillation (HCC) and Hypertension.  Past Surgical History:  has a past surgical history that includes Hysterectomy; hip surgery (Left, 2023); and IR KYPHOPLASTY LUMBAR 1 VERTEBRAL BODY (2024).    Assessment   Assessment: 89-year-old lady with history of atrial fibrillation, dementia who had a fall sustaining L1 compression fracture with intractable low back pain admitted with plans for kyphoplasty on 2024. Pt currently requiring Ax1 for bed mobility, transfers and amb with RW. Per son, pt requires Ax1 for all mobility and ADL's at baseline and receives needed assist at ECF. Pt with no further acute PT needs at this time. Continued ambulation/mobility with nursing staff recommended to son. Rec return to ECF with continued A, as well as PT if available. Will sign off from PT services.  Therapy Prognosis: Good  Decision Making: Medium Complexity  Barriers to Learning: language  Requires PT Follow-Up: No  Activity Tolerance  Activity Tolerance: Patient tolerated evaluation without incident;Patient tolerated treatment well     Plan   Physical Therapy Plan  General Plan: Discharge with evaluation only  Safety Devices  Type of Devices: Gait belt, Nurse notified, Left in chair, Chair alarm in place, Call light within reach (son present)     Restrictions  Position Activity Restriction  Other position/activity restrictions: up as tolerated     Subjective   Pain: grimacing at times w/ mobility - not verbalizing pain per son 
Physical Therapy/Occupational Therapy   Hold    Orders received and chart reviewed. Per chart and d/w RN pt off the floor for kyphoplasty. PT/OT will attempt to evaluate pt at later time vs later date as appropriate. RN aware.     Angelo Davis, PT, DPT   Sylvia Tiwari OTR/L #[id number]          
Physical Therapy/Occupational Therapy   Refusal     Orders received and chart reviewed. Approached pt this AM for PT/OT evaluation. Pt was asleep, woke easily then stated \"come back\" and closed eyes. Will attempt to evaluate pt at later time vs later date as appropriate and as schedule permits. RN aware.     Angelo Davis, PT, DPT   Arnold Saavedra, MOT, OTR/L     
Pt alert to self only. Son at bedside. Endorsing pain to back treated with meds per MAR. Ambulating x1-2 GB walker. Up as tolerated, voiding adequately via BRP/ incontinent briefs. Tolerating PO diet and fluids without complication. Critical potassium noted at 2.9. IV potassium replacement per order. Pt tolerated well. Standard safety measures in place. Denies needs. Plan of care continues.    
Pt alert to self only. Son at bedside. Endorsing pain to back treated with meds per MAR. Ambulating x1-2 GB walker. Up as tolerated, voiding adequately via BRP/ incontinent briefs. Tolerating PO diet and fluids without complication. Standard safety measures in place. Denies needs. Plan of care continues.   
Pt ao to self, VSS RA. Pt tolerating PO diet and fluids well, voiding via BRP. Up x1 GB/W.   Using NVPS to monitor pain d/t language barrier. Pain to managed with PRN medications.  Pt can be impulsive at times, attempting to get OOB, Avasys in use.  All standard fall and safety precautions remain in place. Plan of care continues.   
Pt aox4, VSS RA. Pt tolerating PO diet and fluids well, voiding via external catheter.   FLACC Scale used to assess for pain. Pain being managed via PRN medications No acute events this shift.  All standard fall and safety precautions remain in place. Plan of care continues.     
Pt had not voided this shift, bladder scan for 467 mL. Then promoted pt to void at toilet. Pt able to void, post void scan shows 25 mL. UAMIC obtained for lab.   Pt up x1 W/GB, tolerated well.  
Pt returned from kyphoplasty without complication. VSS on room air with exception to elevation in BP. Per MD Rios hold PRN hydralazine at this time. Son updated at bedside. Standard safety measures in place. Plan of care continues.   
Pt to IR via transport without complication. Plan of care continues.   
Pt transferred to room 5504. Pt alert and oriented to self. Unable to answer other orientation questions. Attempted to use  services. Attempt unsuccessful due to difficulty translating pt's speech. IV intact, infusing per MAR. Encouraged to eat dinner, pt refused though tolerating PO fluids. No signs or symptoms of pain observed. Fall and safety precautions in place, call light within reach.  
Pt. Alert and oriented to herself only. She is disoriented to place, time and situation. According to her son that is her baseline. The son was present on the bedside during assessment. Pt. BP was elevated 201/95 during the time of morning assessment and also the pt. was complaining of pain. So, the scheduled BP medications  and pain medication was given as per MAR. This RN notified MD Enriquez at bedside. Retook BP after an hour of intervention and it came down to 162/84. MD aware of it. In regular diet and tolerating Po well. Standard safety precautions in place and call light within reach.  
Unable to finish admission d/t language barrier. Attempts made to use , unable to find exact dialect spoken by patient. Son has been used as  in past. Will be here in am to speak with physicians.   
Vitals:    02/24/24 2345   BP: (!) 181/78   Pulse: 85   Resp: 16   Temp: 97.5 °F (36.4 °C)   SpO2: 95%     BP reported to hospitalist NP. Pt is resting comfortably and asymptomatic at this time. No new orders at this time. WCTM.   
Monday, 2/26 at 0800.       PMH: hypertension, A-fib, GERD, and CAD.  Family / Caregiver Present: Yes (son)  Referring Practitioner: Dwayne Chahal MD  Diagnosis: Intractable Low Back Pain    Subjective  Subjective: In bed on arrival. Smiling. Interpretation by son (as son and nurses report  is not effective means of communication).    grimacing at times w/ mobility - not verbalizing pain per son report.    Social/Functional History  Social/Functional History  Type of Home: Facility ([facility])  Home Layout: One level  Bathroom Shower/Tub: Walk-in shower  Bathroom Toilet: Standard  Bathroom Equipment: Grab bars in shower, Shower chair, Grab bars around toilet  Home Equipment: Walker, rolling  Has the patient had two or more falls in the past year or any fall with injury in the past year?: Yes  ADL Assistance: Needs assistance  Ambulation Assistance: Needs assistance (using RW; son reports pt forgets to use RW at times)  Transfer Assistance: Needs assistance  Additional Comments: NOTE: above information obtained from pt's son (as  has not been working per nursing/son); has had a functional decline since THR in November - has been requiring incresased assist since that time; supposed to call for assistance, does forget to call sometimes. Pt gets up to BSC at nighttime. Receives assist w/ all ADLs including toileting.       Objective                  Safety Devices  Type of Devices: Gait belt;Nurse notified;Left in chair;Chair alarm in place;Call light within reach (son at bedside assisting pt w/ meal)    Balance  Sitting:  (SBA at EOB and on toilet)  Standing: Impaired (CGA w/ UE support; pt hesitant to release  on sink to wash hands - requiring unilateral support)    Toilet Transfers  Toilet - Technique: Ambulating (using RW)  Equipment Used: Standard toilet  Toilet Transfer: Contact guard assistance  Toilet Transfers Comments: Note: physical prompting to reach for grab bar    AROM:

## (undated) DEVICE — MARKER SURG SKIN UTIL BLK REG TIP NONSMEARING W/ 6IN RUL

## (undated) DEVICE — PEEL-AWAY HOOD: Brand: FLYTE, SURGICOOL

## (undated) DEVICE — SUTURE MCRYL SZ 3-0 L27IN ABSRB UD PS-2 3/8 CIR REV CUT NDL MCP427H

## (undated) DEVICE — SPONGE GZ W4XL4IN COT 12 PLY TYP VII WVN C FLD DSGN STERILE

## (undated) DEVICE — GARMENT,MEDLINE,DVT,INT,CALF,MED, GEN2: Brand: MEDLINE

## (undated) DEVICE — SUTURE VCRL SZ 2-0 L18IN ABSRB UD CT-1 L36MM 1/2 CIR J839D

## (undated) DEVICE — DUAL CUT SAGITTAL BLADE

## (undated) DEVICE — SYRINGE, LUER LOCK, 60ML: Brand: MEDLINE

## (undated) DEVICE — GOWN,SIRUS,POLYRNF,BRTHSLV,XL,30/CS: Brand: MEDLINE

## (undated) DEVICE — BIPOLAR SEALER 23-112-1 AQM 6.0: Brand: AQUAMANTYS ®

## (undated) DEVICE — SUTURE VCRL + SZ 0 L27IN ABSRB UD CT-1 L36MM 1/2 CIR TAPR VCP260H

## (undated) DEVICE — HOLDER SCALP PLAS G STD

## (undated) DEVICE — SYRINGE MED 50ML LUERLOCK TIP

## (undated) DEVICE — GLOVE ORTHO 7 1/2   MSG9475

## (undated) DEVICE — PROTECTOR ULN NRV PUR FOAM HK LOOP STRP ANATOMICALLY

## (undated) DEVICE — NEEDLE SUT SZ 3 MAYO 1 2 CIR TAPR PNT DISPOSABLE

## (undated) DEVICE — BLADE,CARBON-STEEL,15,STRL,DISPOSABLE,TB: Brand: MEDLINE

## (undated) DEVICE — ADHESIVE SKIN CLOSURE WND 8.661X1.5 IN 22 CM LIQUIBAND SECUR

## (undated) DEVICE — TOWEL,STOP FLAG GOLD N-W: Brand: MEDLINE

## (undated) DEVICE — UNDERGLOVE SURG SZ 8 BLU LTX FREE SYN POLYISOPRENE POLYMER

## (undated) DEVICE — BONE PREPARATION KIT: Brand: BIOPREP

## (undated) DEVICE — SUTURE ETHBND EXCEL SZ 5 L30IN NONABSORBABLE GRN L40MM V-37 MB66G

## (undated) DEVICE — APPLICATOR MEDICATED 26 CC SOLUTION HI LT ORNG CHLORAPREP

## (undated) DEVICE — DRESSING WND ISLAND 4X8 IN ANTIMICROBIAL BARR THERABOND 3D

## (undated) DEVICE — 6619 2 PTNT ISO SYS INCISE AREA&LT;(&GT;&&LT;)&GT;P: Brand: STERI-DRAPE™ IOBAN™ 2

## (undated) DEVICE — SYRINGE MED 30ML STD CLR PLAS LUERLOCK TIP N CTRL DISP

## (undated) DEVICE — ANTERIOR TOTAL HIP: Brand: MEDLINE INDUSTRIES, INC.

## (undated) DEVICE — DRESSING W4XL8IN ISLANDTHERABOND 3D

## (undated) DEVICE — SOLUTION IRRIG 3000ML 0.9% SOD CHL USP UROMATIC PLAS CONT

## (undated) DEVICE — 3M™ STERI-DRAPE™ INSTRUMENT POUCH 1018: Brand: STERI-DRAPE™

## (undated) DEVICE — SOLUTION IRRIG 1000ML STRL H2O USP PLAS POUR BTL